# Patient Record
Sex: FEMALE | Race: WHITE
[De-identification: names, ages, dates, MRNs, and addresses within clinical notes are randomized per-mention and may not be internally consistent; named-entity substitution may affect disease eponyms.]

---

## 2019-06-04 ENCOUNTER — HOSPITAL ENCOUNTER (INPATIENT)
Dept: HOSPITAL 91 - MS1 | Age: 59
LOS: 5 days | Discharge: HOME | DRG: 193 | End: 2019-06-09
Payer: COMMERCIAL

## 2019-06-04 ENCOUNTER — HOSPITAL ENCOUNTER (INPATIENT)
Dept: HOSPITAL 10 - 6WM | Age: 59
LOS: 5 days | Discharge: HOME | DRG: 193 | End: 2019-06-09
Attending: INTERNAL MEDICINE | Admitting: INTERNAL MEDICINE
Payer: COMMERCIAL

## 2019-06-04 VITALS — SYSTOLIC BLOOD PRESSURE: 142 MMHG | DIASTOLIC BLOOD PRESSURE: 83 MMHG | RESPIRATION RATE: 20 BRPM | HEART RATE: 104 BPM

## 2019-06-04 VITALS — HEART RATE: 94 BPM

## 2019-06-04 VITALS — DIASTOLIC BLOOD PRESSURE: 67 MMHG | RESPIRATION RATE: 18 BRPM | SYSTOLIC BLOOD PRESSURE: 117 MMHG | HEART RATE: 97 BPM

## 2019-06-04 VITALS — SYSTOLIC BLOOD PRESSURE: 133 MMHG | DIASTOLIC BLOOD PRESSURE: 71 MMHG | RESPIRATION RATE: 20 BRPM | HEART RATE: 99 BPM

## 2019-06-04 VITALS — SYSTOLIC BLOOD PRESSURE: 142 MMHG | HEART RATE: 97 BPM | RESPIRATION RATE: 18 BRPM | DIASTOLIC BLOOD PRESSURE: 72 MMHG

## 2019-06-04 VITALS — HEART RATE: 101 BPM | SYSTOLIC BLOOD PRESSURE: 130 MMHG | RESPIRATION RATE: 18 BRPM | DIASTOLIC BLOOD PRESSURE: 64 MMHG

## 2019-06-04 VITALS
HEIGHT: 65 IN | HEIGHT: 65 IN | BODY MASS INDEX: 36.73 KG/M2 | WEIGHT: 220.46 LBS | WEIGHT: 220.46 LBS | BODY MASS INDEX: 36.73 KG/M2

## 2019-06-04 VITALS — HEART RATE: 103 BPM

## 2019-06-04 VITALS — HEART RATE: 95 BPM

## 2019-06-04 DIAGNOSIS — G47.33: ICD-10-CM

## 2019-06-04 DIAGNOSIS — J18.9: Primary | ICD-10-CM

## 2019-06-04 DIAGNOSIS — J96.01: ICD-10-CM

## 2019-06-04 DIAGNOSIS — F32.9: ICD-10-CM

## 2019-06-04 DIAGNOSIS — J45.909: ICD-10-CM

## 2019-06-04 DIAGNOSIS — E78.00: ICD-10-CM

## 2019-06-04 DIAGNOSIS — J20.9: ICD-10-CM

## 2019-06-04 DIAGNOSIS — E78.5: ICD-10-CM

## 2019-06-04 DIAGNOSIS — I10: ICD-10-CM

## 2019-06-04 DIAGNOSIS — E66.9: ICD-10-CM

## 2019-06-04 DIAGNOSIS — E11.40: ICD-10-CM

## 2019-06-04 DIAGNOSIS — D64.9: ICD-10-CM

## 2019-06-04 LAB
ADD MAN DIFF?: YES
ALANINE AMINOTRANSFERASE: 23 IU/L (ref 13–69)
ALBUMIN/GLOBULIN RATIO: 0.86
ALBUMIN: 3.2 G/DL (ref 3.3–4.9)
ALKALINE PHOSPHATASE: 151 IU/L (ref 42–121)
ALLEN TEST: (no result)
ANION GAP: 7 (ref 5–13)
ARTERIAL BASE EXCESS: 0.9 MMOL/L (ref -3–3)
ARTERIAL BLOOD GAS OXYGEN SAT: 91.1 MMHG (ref 95–98)
ARTERIAL COHB: 0.3 % (ref 0–3)
ARTERIAL FRACTION OF OXYHGB: 90.6 % (ref 93–99)
ARTERIAL HCO3: 26.7 MMOL/L (ref 22–26)
ARTERIAL METHB: 0.3 % (ref 0–1.5)
ARTERIAL PCO2: 47.2 MMHG (ref 35–45)
ASPARTATE AMINO TRANSFERASE: 25 IU/L (ref 15–46)
BAND NEUTROPHILS #M: 2.4 10^3/UL (ref 0–0.6)
BAND NEUTROPHILS % (M): 24 % (ref 0–4)
BILIRUBIN,DIRECT: 0 MG/DL (ref 0–0.2)
BILIRUBIN,TOTAL: 0.3 MG/DL (ref 0.2–1.3)
BLOOD UREA NITROGEN: 19 MG/DL (ref 7–20)
CALCIUM: 8.5 MG/DL (ref 8.4–10.2)
CARBON DIOXIDE: 30 MMOL/L (ref 21–31)
CHLORIDE: 103 MMOL/L (ref 97–110)
CREATININE: 0.57 MG/DL (ref 0.44–1)
EOSINOPHILS % (M): 1 % (ref 0–7)
ERYTHROBLAST% (NRBC) (M): 1 % (ref 0–0)
FIO2: 33 %
GLOBULIN: 3.7 G/DL (ref 1.3–3.2)
GLUCOSE: 255 MG/DL (ref 70–220)
HEMATOCRIT: 35.3 % (ref 37–47)
HEMOGLOBIN A1C: 9.1 % (ref 0–5.9)
HEMOGLOBIN: 10.8 G/DL (ref 12–16)
IMMATURE GRANS #M: 0.13 10^3/UL (ref 0–0.03)
IMMATURE GRANS % (M): 1.3 % (ref 0–0.43)
LYMPHOCYTES #M: 1.4 10^3/UL (ref 0.8–2.9)
LYMPHOCYTES % (M): 14 % (ref 15–51)
MEAN CORPUSCULAR HEMOGLOBIN: 26 PG (ref 29–33)
MEAN CORPUSCULAR HGB CONC: 30.6 G/DL (ref 32–37)
MEAN CORPUSCULAR VOLUME: 85.1 FL (ref 82–101)
MEAN PLATELET VOLUME: 11.3 FL (ref 7.4–10.4)
MODE: (no result)
MONOCYTE #M: 0.7 10^3/UL (ref 0.3–0.9)
MONOCYTES % (M): 7 % (ref 0–11)
MYELOCYTES #M: 0.2 10^3/UL (ref 0–0)
MYELOCYTES % (M): 2 % (ref 0–0)
NUCLEATED RED BLOOD CELLS%: 0 /100WBC (ref 0–0)
O2 A-A PPRESDIFF RESPIRATORY: 115.3 MMHG (ref 7–24)
PLATELET COUNT: 258 10^3/UL (ref 140–415)
PLATELET ESTIMATE: NORMAL
POIKILOCYTOSIS: (no result) (ref 0–0)
POLYCHROMASIA: (no result) (ref 0–0)
POSITIVE DIFF: (no result)
POTASSIUM: 4 MMOL/L (ref 3.5–5.1)
PROCALCITONIN: 0.27 NG/ML (ref 0–0.1)
REACTIVE LYMPHOCYTES #M: 0.6 10^3/UL (ref 0–0)
REACTIVE LYMPHOCYTES% (M): 6 % (ref 0–0)
RED BLOOD COUNT: 4.15 10^6/UL (ref 4.2–5.4)
RED CELL DISTRIBUTION WIDTH: 15.2 % (ref 11.5–14.5)
SEG NEUT #M: 5 10^3/UL (ref 1.6–7.5)
SEGMENTED NEUTROPHILS (M) %: 46 % (ref 39–77)
SMUDGE%M: 4 % (ref 0–0)
SODIUM: 140 MMOL/L (ref 135–144)
TOTAL PROTEIN: 6.9 G/DL (ref 6.1–8.1)
WHITE BLOOD COUNT: 10.4 10^3/UL (ref 4.8–10.8)

## 2019-06-04 PROCEDURE — 94664 DEMO&/EVAL PT USE INHALER: CPT

## 2019-06-04 PROCEDURE — 84145 PROCALCITONIN (PCT): CPT

## 2019-06-04 PROCEDURE — 93306 TTE W/DOPPLER COMPLETE: CPT

## 2019-06-04 PROCEDURE — 82962 GLUCOSE BLOOD TEST: CPT

## 2019-06-04 PROCEDURE — 82803 BLOOD GASES ANY COMBINATION: CPT

## 2019-06-04 PROCEDURE — 85025 COMPLETE CBC W/AUTO DIFF WBC: CPT

## 2019-06-04 PROCEDURE — 84100 ASSAY OF PHOSPHORUS: CPT

## 2019-06-04 PROCEDURE — 80053 COMPREHEN METABOLIC PANEL: CPT

## 2019-06-04 PROCEDURE — 94640 AIRWAY INHALATION TREATMENT: CPT

## 2019-06-04 PROCEDURE — 83540 ASSAY OF IRON: CPT

## 2019-06-04 PROCEDURE — 87070 CULTURE OTHR SPECIMN AEROBIC: CPT

## 2019-06-04 PROCEDURE — 84443 ASSAY THYROID STIM HORMONE: CPT

## 2019-06-04 PROCEDURE — 36600 WITHDRAWAL OF ARTERIAL BLOOD: CPT

## 2019-06-04 PROCEDURE — 80048 BASIC METABOLIC PNL TOTAL CA: CPT

## 2019-06-04 PROCEDURE — 83735 ASSAY OF MAGNESIUM: CPT

## 2019-06-04 PROCEDURE — 83036 HEMOGLOBIN GLYCOSYLATED A1C: CPT

## 2019-06-04 PROCEDURE — 71045 X-RAY EXAM CHEST 1 VIEW: CPT

## 2019-06-04 RX ADMIN — INSULIN ASPART 1 UNIT: 100 INJECTION, SOLUTION INTRAVENOUS; SUBCUTANEOUS at 09:06

## 2019-06-04 RX ADMIN — GABAPENTIN SCH MG: 300 CAPSULE ORAL at 20:26

## 2019-06-04 RX ADMIN — IPRATROPIUM BROMIDE AND ALBUTEROL SULFATE 1 ML: .5; 3 SOLUTION RESPIRATORY (INHALATION) at 16:32

## 2019-06-04 RX ADMIN — INSULIN ASPART 1 UNIT: 100 INJECTION, SOLUTION INTRAVENOUS; SUBCUTANEOUS at 12:22

## 2019-06-04 RX ADMIN — ZOLPIDEM TARTRATE PRN MG: 5 TABLET, FILM COATED ORAL at 22:06

## 2019-06-04 RX ADMIN — DOXYCYCLINE 1 MLS/HR: 100 INJECTION, POWDER, LYOPHILIZED, FOR SOLUTION INTRAVENOUS at 09:00

## 2019-06-04 RX ADMIN — CEFTRIAXONE 1 MLS/HR: 1 INJECTION, SOLUTION INTRAVENOUS at 09:07

## 2019-06-04 RX ADMIN — LISINOPRIL SCH MG: 10 TABLET ORAL at 16:16

## 2019-06-04 RX ADMIN — INSULIN ASPART 1 UNIT: 100 INJECTION, SOLUTION INTRAVENOUS; SUBCUTANEOUS at 21:00

## 2019-06-04 RX ADMIN — VENLAFAXINE HYDROCHLORIDE 1 MG: 75 CAPSULE, EXTENDED RELEASE ORAL at 23:23

## 2019-06-04 RX ADMIN — FENOFIBRATE SCH MG: 145 TABLET ORAL at 16:16

## 2019-06-04 RX ADMIN — INSULIN ASPART 1 UNIT: 100 INJECTION, SOLUTION INTRAVENOUS; SUBCUTANEOUS at 18:28

## 2019-06-04 RX ADMIN — IPRATROPIUM BROMIDE AND ALBUTEROL SULFATE SCH ML: .5; 3 SOLUTION RESPIRATORY (INHALATION) at 14:15

## 2019-06-04 RX ADMIN — GABAPENTIN 1 MG: 300 CAPSULE ORAL at 16:15

## 2019-06-04 RX ADMIN — IPRATROPIUM BROMIDE AND ALBUTEROL SULFATE SCH ML: .5; 3 SOLUTION RESPIRATORY (INHALATION) at 20:00

## 2019-06-04 RX ADMIN — FOLIC ACID SCH MLS/HR: 5 INJECTION, SOLUTION INTRAMUSCULAR; INTRAVENOUS; SUBCUTANEOUS at 04:40

## 2019-06-04 RX ADMIN — IPRATROPIUM BROMIDE AND ALBUTEROL SULFATE SCH ML: .5; 3 SOLUTION RESPIRATORY (INHALATION) at 10:34

## 2019-06-04 RX ADMIN — IPRATROPIUM BROMIDE AND ALBUTEROL SULFATE 1 ML: .5; 3 SOLUTION RESPIRATORY (INHALATION) at 14:15

## 2019-06-04 RX ADMIN — IPRATROPIUM BROMIDE AND ALBUTEROL SULFATE 1 ML: .5; 3 SOLUTION RESPIRATORY (INHALATION) at 20:00

## 2019-06-04 RX ADMIN — ATORVASTATIN CALCIUM SCH MG: 40 TABLET, FILM COATED ORAL at 20:26

## 2019-06-04 RX ADMIN — VENLAFAXINE HYDROCHLORIDE SCH MG: 75 CAPSULE, EXTENDED RELEASE ORAL at 23:23

## 2019-06-04 RX ADMIN — THIAMINE HYDROCHLORIDE 1 MLS/HR: 100 INJECTION, SOLUTION INTRAMUSCULAR; INTRAVENOUS at 04:40

## 2019-06-04 RX ADMIN — FUROSEMIDE 1 MG: 10 INJECTION, SOLUTION INTRAVENOUS at 16:15

## 2019-06-04 RX ADMIN — VENLAFAXINE HYDROCHLORIDE SCH MG: 75 CAPSULE, EXTENDED RELEASE ORAL at 16:16

## 2019-06-04 RX ADMIN — BUPROPION HYDROCHLORIDE 1 MG: 150 TABLET, EXTENDED RELEASE ORAL at 16:16

## 2019-06-04 RX ADMIN — FAMOTIDINE 1 MG: 10 INJECTION, SOLUTION INTRAVENOUS at 09:07

## 2019-06-04 RX ADMIN — INSULIN GLARGINE 1 UNITS: 100 INJECTION, SOLUTION SUBCUTANEOUS at 20:59

## 2019-06-04 RX ADMIN — IPRATROPIUM BROMIDE AND ALBUTEROL SULFATE SCH ML: .5; 3 SOLUTION RESPIRATORY (INHALATION) at 04:38

## 2019-06-04 RX ADMIN — INSULIN ASPART 1 UNIT: 100 INJECTION, SOLUTION INTRAVENOUS; SUBCUTANEOUS at 17:39

## 2019-06-04 RX ADMIN — LISINOPRIL 1 MG: 10 TABLET ORAL at 16:16

## 2019-06-04 RX ADMIN — VENLAFAXINE HYDROCHLORIDE 1 MG: 75 CAPSULE, EXTENDED RELEASE ORAL at 16:16

## 2019-06-04 RX ADMIN — ATORVASTATIN CALCIUM 1 MG: 40 TABLET, FILM COATED ORAL at 20:26

## 2019-06-04 RX ADMIN — DIPHENHYDRAMINE HYDROCHLORIDE SCH MG: 50 INJECTION, SOLUTION INTRAMUSCULAR; INTRAVENOUS at 09:07

## 2019-06-04 RX ADMIN — BUPROPION HYDROCHLORIDE SCH MG: 150 TABLET, EXTENDED RELEASE ORAL at 16:16

## 2019-06-04 RX ADMIN — IPRATROPIUM BROMIDE AND ALBUTEROL SULFATE 1 ML: .5; 3 SOLUTION RESPIRATORY (INHALATION) at 04:38

## 2019-06-04 RX ADMIN — IPRATROPIUM BROMIDE AND ALBUTEROL SULFATE SCH ML: .5; 3 SOLUTION RESPIRATORY (INHALATION) at 16:32

## 2019-06-04 RX ADMIN — GABAPENTIN 1 MG: 300 CAPSULE ORAL at 20:26

## 2019-06-04 RX ADMIN — GABAPENTIN SCH MG: 300 CAPSULE ORAL at 16:15

## 2019-06-04 RX ADMIN — DEXTROSE MONOHYDRATE 1 MLS/HR: 5 INJECTION, SOLUTION INTRAVENOUS at 16:14

## 2019-06-04 RX ADMIN — ZOLPIDEM TARTRATE 1 MG: 5 TABLET, FILM COATED ORAL at 22:06

## 2019-06-04 RX ADMIN — IPRATROPIUM BROMIDE AND ALBUTEROL SULFATE 1 ML: .5; 3 SOLUTION RESPIRATORY (INHALATION) at 10:34

## 2019-06-04 RX ADMIN — FENOFIBRATE 1 MG: 145 TABLET, FILM COATED ORAL at 16:16

## 2019-06-04 NOTE — CONS
DATE OF ADMISSION: 06/04/2019

DATE OF CONSULTATION:  06/04/2019

 

 

 

TYPE OF CONSULTATION:  Pulmonary.

 

Thank you, Dr. El, for this consultation.

 

HISTORY OF PRESENT ILLNESS:  This is a 59-year-old lady who presented to outside facility at [facility] with increasing shortness of breath, cough, chest congestion but no hemoptysis, hematemesis. 
 States the symptoms had been ongoing for 4 to 5 days.

 

PAST MEDICAL HISTORY:  Hyperlipidemia, diabetes mellitus.

 

MEDICATIONS:  Per chart.

 

ALLERGIES:  NONE.

 

SOCIAL HISTORY:  She is a current nonsmoker, no alcohol, no history of drug use.

 

FAMILY HISTORY:  Noncontributory.

 

SYSTEMS REVIEW:  A 12-point review of systems was negative other than that mentioned above.

 

PHYSICAL EXAMINATION:

GENERAL:  Moderately obese lady, comfortable at rest, talking in full and complete sentences.

VITAL SIGNS:  Currently afebrile, pulse is 90, blood pressure 133/71, O2 saturation 96%, FiO2 of 5 li
ters.

NECK:  Supple.  No JVD.

CARDIAC:  S1, S2.  No added sounds or murmurs.

CHEST:  Diminished air entry bilaterally.

ABDOMEN:  Soft, nontender.  No guarding or rebound.

EXTREMITIES:  No cyanosis, clubbing or edema.

NEUROLOGIC:  Grossly intact.  No focal deficits.

 

LABORATORIES:  White count 10.4, hemoglobin 10.8, platelets of 258.  BUN 19, creatinine 0.57.  PaO2 w
as 64 on 4 liters.

 

DIAGNOSTIC DATA:  Chest x-ray demonstrated mild congestive cardiac failure, possible bibasilar bronch
opneumonia.

 

IMPRESSION AND PLAN:  Acute hypoxemic respiratory failure, likely secondary to combination of congest
reggie heart failure and possible community-acquired pneumonia.

 

The patient will require:

1.  Continue current antibiotics.

2.  Check procalcitonin level.

3.  Consider echocardiogram and trial of Lasix.

4.  DVT and GI prophylaxis.

 

 

Dictated By: WILLI FRIEND MD

 

SV/NTS

DD:    06/04/2019 14:58:11

DT:    06/04/2019 17:08:09

Conf#: [id number]

DID#:  [id number]

CC: ONEYDA LINDSEY MD;*EndCC*

## 2019-06-04 NOTE — HP
DATE OF ADMISSION: 06/04/2019

 

REASON FOR ADMISSION:  Transferred from [place] [facility] due to pneumonia, hypoxia.

 

HISTORY OF PRESENT ILLNESS:  This is a 59-year-old female with a past medical history of diabetes, hy
pertension, hyperlipidemia, depression, asthma who was having shortness of breath and weakness and on
going cough for past 1 week.  The patient said that her daughter was sick at home.  She was intermitt
ently coughing for the last few days and progressively worsening shortness of breath until last night
.  She had not been sleeping for the last 3 days.  She was also having intermittently on and off feve
rs as high as to 101.  She could not feel any better.  She had some anterior chest, sharp pain associ
ated with coughing that made her worried and came to the emergency department at [facility].  Th
ere, patient had a blood pressure 165/80, pulse 97, temperature 100.2, respirations 24.  The patient 
was put on 6 liters.  The patient had a BUN and creatinine within normal limit.  LFTs within normal l
imit.  Lactate was 1.4.  EKG showed nonspecific ST-T wave changes, T-wave inversions in V1, V2.  Ches
t x-ray showed bibasilar infiltrates.  White count was 10.8.  Patient was started on Rocephin and dox
ycycline and was transferred due to insurance reasons.  Currently, the patient feels that she is feel
ing a little better but still coughing intermittently at times.

 

PAST MEDICAL HISTORY:

1.  Diabetes type 2.

2.  Hypertension.

3.  Hyperlipidemia.

4.  Obesity.

5.  Diabetic neuropathy.

6.  Depression.

 

ALLERGIES:  NONE.

 

MEDICATIONS TAKING AT HOME:

1.  Metformin 1000.

2.  Atenolol 50.

3.  Atorvastatin 40.

4.  Buprenorphine 300.

5.  Fenofibrate 160.

6.  Gabapentin 300.

7.  Lisinopril 10.

8.  Naproxen.

9.  NovoLog.

10.  Tramadol.

 

SOCIAL HISTORY:  The patient is an ex-smoker.  Denies any alcohol, any recreational drug use.  Vicki kebede lives with her daughter in [place].

 

FAMILY HISTORY:  Noncontributory.

 

REVIEW OF SYSTEMS:  The patient complained had fevers, shortness of breath and cough with yoko sputu
m for past 1 week.  Denies any orthopnea, PND, lower extremity edema.  Had some anterior chest pain, 
sharp, associated with coughing.  Denies any abdominal pain, nausea, vomiting, diarrhea.  Had some he
adache.  Denied any blurry vision.  Denies any focal neurological deficits.

 

PHYSICAL EXAMINATION:

VITAL SIGNS:  Currently, temperature 98.5, blood pressure 133/71, heart rate 99, currently 94% to 95%
 on 6 liters oxygen.

GENERAL:  The patient is awake, alert, oriented, does not appear to be any acute distress.

HEENT:  Pupils equal, round, reactive to light.

NECK:  Supple.

HEART:  Regular rate and rhythm.

LUNGS:  A few scattered rhonchi, some decreased breath sounds bilaterally.

ABDOMEN:  Obese, positive bowel sounds.

EXTREMITIES:  Trace edema.

 

DIAGNOSTIC DATA:

BUN of 19, creatinine 0.5, glucose 255, alkaline phosphatase 151.  White count 10.4, hemoglobin 10.8,
 platelet count 258.  ABG showed pH of 7.3, pCO2 of 47, pO2 of 64, bicarbonate of 26.  Chest x-ray ha
d showed bronchopneumonia of the lower lung zones.

 

ASSESSMENT:  This is a 59-year-old female who presented with:

1.  Pneumonia in the lower lung zones.

2.  Systemic inflammatory response syndrome secondary to #2.

3.  Hypoxia secondary to #1.

4.  Rule out obstructive sleep apnea.

5.  Hypertension.

6.  Diabetes type 2.

7.  Depression.

8.  Hyperlipidemia.

9.  Hypercholesterolemia.

10.  History of asthma.

 

PLAN:  At this period of time, the patient is admitted to [facility].  The patient will be continued on IV 
antibiotics with Rocephin and azithromycin.  The patient will also be continued on DuoNeb around the 
clock.  Gentle IV fluids.  Sputum culture will be sent.  Pulmonary consultation will be requested.  R
est of the treatment will depend on the patient's hospitalization course.

 

 

Dictated By: LESLEY WEISS

 

RB/MARGIE

DD:    06/04/2019 12:28:07

DT:    06/04/2019 16:04:38

Conf#: [id number]

DID#:  [id number]

CC: ONEYDA LINDSEY MD;*End*

## 2019-06-04 NOTE — QN
Documentation


Comment


pt seen and examined











LESLEY WEISS MD               Jun 4, 2019 12:22

## 2019-06-05 VITALS — DIASTOLIC BLOOD PRESSURE: 69 MMHG | RESPIRATION RATE: 20 BRPM | SYSTOLIC BLOOD PRESSURE: 123 MMHG | HEART RATE: 95 BPM

## 2019-06-05 VITALS — HEART RATE: 97 BPM | SYSTOLIC BLOOD PRESSURE: 125 MMHG | DIASTOLIC BLOOD PRESSURE: 75 MMHG | RESPIRATION RATE: 20 BRPM

## 2019-06-05 VITALS — HEART RATE: 97 BPM

## 2019-06-05 VITALS — SYSTOLIC BLOOD PRESSURE: 101 MMHG | HEART RATE: 99 BPM | RESPIRATION RATE: 20 BRPM | DIASTOLIC BLOOD PRESSURE: 58 MMHG

## 2019-06-05 VITALS — HEART RATE: 95 BPM

## 2019-06-05 VITALS — HEART RATE: 93 BPM

## 2019-06-05 VITALS — SYSTOLIC BLOOD PRESSURE: 122 MMHG | RESPIRATION RATE: 20 BRPM | HEART RATE: 95 BPM | DIASTOLIC BLOOD PRESSURE: 70 MMHG

## 2019-06-05 VITALS — HEART RATE: 100 BPM

## 2019-06-05 VITALS — HEART RATE: 102 BPM

## 2019-06-05 VITALS — HEART RATE: 96 BPM

## 2019-06-05 RX ADMIN — FAMOTIDINE 1 MG: 20 TABLET ORAL at 21:03

## 2019-06-05 RX ADMIN — BUPROPION HYDROCHLORIDE SCH MG: 150 TABLET, EXTENDED RELEASE ORAL at 09:07

## 2019-06-05 RX ADMIN — GABAPENTIN 1 MG: 300 CAPSULE ORAL at 09:06

## 2019-06-05 RX ADMIN — IPRATROPIUM BROMIDE AND ALBUTEROL SULFATE 1 ML: .5; 3 SOLUTION RESPIRATORY (INHALATION) at 20:26

## 2019-06-05 RX ADMIN — IPRATROPIUM BROMIDE AND ALBUTEROL SULFATE 1 ML: .5; 3 SOLUTION RESPIRATORY (INHALATION) at 16:21

## 2019-06-05 RX ADMIN — FUROSEMIDE 1 MG: 10 INJECTION, SOLUTION INTRAVENOUS at 09:05

## 2019-06-05 RX ADMIN — INSULIN ASPART 1 UNIT: 100 INJECTION, SOLUTION INTRAVENOUS; SUBCUTANEOUS at 17:11

## 2019-06-05 RX ADMIN — INSULIN GLARGINE SCH UNITS: 100 INJECTION, SOLUTION SUBCUTANEOUS at 21:06

## 2019-06-05 RX ADMIN — IPRATROPIUM BROMIDE AND ALBUTEROL SULFATE 1 ML: .5; 3 SOLUTION RESPIRATORY (INHALATION) at 13:01

## 2019-06-05 RX ADMIN — FENOFIBRATE SCH MG: 145 TABLET ORAL at 09:06

## 2019-06-05 RX ADMIN — LISINOPRIL 1 MG: 10 TABLET ORAL at 09:06

## 2019-06-05 RX ADMIN — ATORVASTATIN CALCIUM SCH MG: 40 TABLET, FILM COATED ORAL at 21:03

## 2019-06-05 RX ADMIN — GABAPENTIN SCH MG: 300 CAPSULE ORAL at 13:13

## 2019-06-05 RX ADMIN — IPRATROPIUM BROMIDE AND ALBUTEROL SULFATE 1 ML: .5; 3 SOLUTION RESPIRATORY (INHALATION) at 08:34

## 2019-06-05 RX ADMIN — THIAMINE HYDROCHLORIDE 1 MLS/HR: 100 INJECTION, SOLUTION INTRAMUSCULAR; INTRAVENOUS at 04:00

## 2019-06-05 RX ADMIN — GABAPENTIN 1 MG: 300 CAPSULE ORAL at 13:13

## 2019-06-05 RX ADMIN — INSULIN GLARGINE 1 UNITS: 100 INJECTION, SOLUTION SUBCUTANEOUS at 21:06

## 2019-06-05 RX ADMIN — IPRATROPIUM BROMIDE AND ALBUTEROL SULFATE SCH ML: .5; 3 SOLUTION RESPIRATORY (INHALATION) at 20:26

## 2019-06-05 RX ADMIN — CEFTRIAXONE 1 MLS/HR: 1 INJECTION, SOLUTION INTRAVENOUS at 16:15

## 2019-06-05 RX ADMIN — IPRATROPIUM BROMIDE AND ALBUTEROL SULFATE SCH ML: .5; 3 SOLUTION RESPIRATORY (INHALATION) at 16:21

## 2019-06-05 RX ADMIN — AZITHROMYCIN MONOHYDRATE SCH MLS/HR: 500 INJECTION, POWDER, LYOPHILIZED, FOR SOLUTION INTRAVENOUS at 17:04

## 2019-06-05 RX ADMIN — IPRATROPIUM BROMIDE AND ALBUTEROL SULFATE SCH ML: .5; 3 SOLUTION RESPIRATORY (INHALATION) at 13:01

## 2019-06-05 RX ADMIN — GABAPENTIN SCH MG: 300 CAPSULE ORAL at 09:06

## 2019-06-05 RX ADMIN — IPRATROPIUM BROMIDE AND ALBUTEROL SULFATE SCH ML: .5; 3 SOLUTION RESPIRATORY (INHALATION) at 08:34

## 2019-06-05 RX ADMIN — BUPROPION HYDROCHLORIDE 1 MG: 150 TABLET, EXTENDED RELEASE ORAL at 09:07

## 2019-06-05 RX ADMIN — THIAMINE HYDROCHLORIDE 1 MLS/HR: 100 INJECTION, SOLUTION INTRAMUSCULAR; INTRAVENOUS at 11:14

## 2019-06-05 RX ADMIN — FAMOTIDINE SCH MG: 20 TABLET ORAL at 21:03

## 2019-06-05 RX ADMIN — LISINOPRIL SCH MG: 10 TABLET ORAL at 09:06

## 2019-06-05 RX ADMIN — IPRATROPIUM BROMIDE AND ALBUTEROL SULFATE SCH ML: .5; 3 SOLUTION RESPIRATORY (INHALATION) at 05:03

## 2019-06-05 RX ADMIN — FOLIC ACID SCH MLS/HR: 5 INJECTION, SOLUTION INTRAMUSCULAR; INTRAVENOUS; SUBCUTANEOUS at 11:14

## 2019-06-05 RX ADMIN — IPRATROPIUM BROMIDE AND ALBUTEROL SULFATE SCH ML: .5; 3 SOLUTION RESPIRATORY (INHALATION) at 00:07

## 2019-06-05 RX ADMIN — FOLIC ACID SCH MLS/HR: 5 INJECTION, SOLUTION INTRAMUSCULAR; INTRAVENOUS; SUBCUTANEOUS at 04:00

## 2019-06-05 RX ADMIN — INSULIN ASPART 1 UNIT: 100 INJECTION, SOLUTION INTRAVENOUS; SUBCUTANEOUS at 11:44

## 2019-06-05 RX ADMIN — DIPHENHYDRAMINE HYDROCHLORIDE SCH MG: 50 INJECTION, SOLUTION INTRAMUSCULAR; INTRAVENOUS at 09:05

## 2019-06-05 RX ADMIN — GABAPENTIN SCH MG: 300 CAPSULE ORAL at 21:03

## 2019-06-05 RX ADMIN — INSULIN ASPART 1 UNIT: 100 INJECTION, SOLUTION INTRAVENOUS; SUBCUTANEOUS at 09:19

## 2019-06-05 RX ADMIN — FENOFIBRATE 1 MG: 145 TABLET, FILM COATED ORAL at 09:06

## 2019-06-05 RX ADMIN — GABAPENTIN 1 MG: 300 CAPSULE ORAL at 21:03

## 2019-06-05 RX ADMIN — IPRATROPIUM BROMIDE AND ALBUTEROL SULFATE 1 ML: .5; 3 SOLUTION RESPIRATORY (INHALATION) at 05:03

## 2019-06-05 RX ADMIN — ATORVASTATIN CALCIUM 1 MG: 40 TABLET, FILM COATED ORAL at 21:03

## 2019-06-05 RX ADMIN — IPRATROPIUM BROMIDE AND ALBUTEROL SULFATE 1 ML: .5; 3 SOLUTION RESPIRATORY (INHALATION) at 00:07

## 2019-06-05 RX ADMIN — DEXTROSE MONOHYDRATE 1 MLS/HR: 5 INJECTION, SOLUTION INTRAVENOUS at 17:04

## 2019-06-05 RX ADMIN — CEFTRIAXONE SCH MLS/HR: 1 INJECTION, SOLUTION INTRAVENOUS at 16:15

## 2019-06-05 RX ADMIN — FAMOTIDINE 1 MG: 10 INJECTION, SOLUTION INTRAVENOUS at 09:05

## 2019-06-05 RX ADMIN — INSULIN ASPART 1 UNIT: 100 INJECTION, SOLUTION INTRAVENOUS; SUBCUTANEOUS at 21:00

## 2019-06-05 NOTE — CONS
Assessment/Plan


Assessment/Plan


Assessment/Plan (Daily)


Assessment and recommendations;





1.  Patient admitted with shortness of breath due to combination of acute 


bronchitis with CHF exacerbation with interval improvement.


2.  History of anemia, diabetes, hyperlipidemia, peripheral neuropathy, 


hypertension and arthritis.





Continue current supportive care.  Patient responding well to current treatment 


regimen.





Consultation Date/Type/Reason


Admit Date/Time


Jun 4, 2019 at 03:10


Initial Consult Date





Type of Consult


Pulmonary


Reason for Consultation


Patient condition is improving.  Reports significant reduction in shortness of 


breath.  Denies any coughing, wheezing.


General exam; middle-aged female, awake alert, currently no distress.


Date/Time of Note


DATE: 6/5/19 


TIME: 10:54





Exam/Review of Systems


Exam


Vitals





Vital Signs


  Date      Temp  Pulse  Resp  B/P (MAP)   Pulse Ox  O2          O2 Flow    FiO2


Time                                                 Delivery    Rate


    6/5/19          108    20                    95  Nasal             4.0


     08:44                                           Cannula


    6/5/19  98.4                   123/69


     07:17                           (87)








Intake and Output





6/4/19 6/4/19 6/5/19





1515:00


23:00


07:00





IntakeIntake Total


50 ml


2060 ml


600 ml





BalanceBalance


50 ml


2060 ml


600 ml











Exam


HEENT exam; supple neck, positive JVD.  No lymphadenopathy.  Midline trachea.  


No thyromegaly.  No neck masses.  Patient has fair dentition.


Chest exam; diminished but clear breath sounds.  S1-S2 audible, no murmurs.  


Regular rhythm.


Abdomen exam; soft, nontender.  No organomegaly.  Bowel sounds audible.


Extremity exam; no peripheral edema clubbing.


CNS exam; no focal deficit.





Results


Result Diagram:  


6/4/19 0538                                                                     


          6/4/19 0536





Results 24hrs





Laboratory Tests


    Test
            6/4/19
12:14  6/4/19
17:27  6/4/19
20:28  6/5/19
09:01


    Bedside Glucose        288  H        329  H        307  H        296  H








Medications


Medication





Current Medications


Albuterol/ Ipratropium (Duoneb) 3 ml Q4H RESP  THERAPY HHN  Last administered on


6/5/19at 08:34; Admin Dose 3 ML;  Start 6/4/19 at 05:00


Albuterol/ Ipratropium (Duoneb) 3 ml Q2H RESP THERAPY  PRN HHN SHORTNESS OF 


BREATH;  Start 6/4/19 at 04:00


Sodium Chloride 1,000 ml @  40 mls/hr Q24H IV  Last administered on 6/4/19at 


04:40; Admin Dose 40 MLS/HR;  Start 6/4/19 at 04:00


Ceftriaxone Sodium 50 ml @  100 mls/hr DAILY IVPB  Last administered on 6/4/19at


09:07; Admin Dose 100 MLS/HR;  Start 6/4/19 at 09:00


Acetaminophen (Tylenol Tab) 650 mg Q6H  PRN PO MILD PAIN(1-3)OR ELEVATED TEMP;  


Start 6/4/19 at 04:00


Diagnostic Test (Pha) (Accu-Chek) 1 ea 02 XX ;  Start 6/5/19 at 02:00


Insulin Aspart (Novolog Insulin Pen) NOVOLOG *MILD* ALGORITHM WITH MEALS  BEDTI


ME SC  Last administered on 6/5/19at 09:19; Admin Dose 4 UNIT;  Start 6/4/19 at 


08:00


Miscellaneous Information 1 ea NOTE XX ;  Start 6/4/19 at 04:30


Glucose (Glutose) 15 gm Q15M  PRN PO DECREASED GLUCOSE;  Start 6/4/19 at 04:30


Glucose (Glutose) 22.5 gm Q15M  PRN PO DECREASED GLUCOSE;  Start 6/4/19 at 04:30


Dextrose (D50w Syringe) 25 ml Q15M  PRN IV DECREASED GLUCOSE;  Start 6/4/19 at 


04:30


Dextrose (D50w Syringe) 50 ml Q15M  PRN IV DECREASED GLUCOSE;  Start 6/4/19 at 


04:30


Glucagon (Glucagen) 1 mg Q15M  PRN IM DECREASED GLUCOSE;  Start 6/4/19 at 04:30


Glucose (Glutose) 15 gm Q15M  PRN BUCCAL DECREASED GLUCOSE;  Start 6/4/19 at 


04:30


Famotidine (Pepcid Iv) 20 mg DAILY IV  Last administered on 6/5/19at 09:05; 


Admin Dose 20 MG;  Start 6/4/19 at 09:00


Atorvastatin Calcium (Lipitor) 40 mg QHS PO  Last administered on 6/4/19at 


20:26; Admin Dose 40 MG;  Start 6/4/19 at 21:00


Bupropion HCl (Wellbutrin Xl) 300 mg DAILY PO  Last administered on 6/5/19at 


09:07; Admin Dose 300 MG;  Start 6/4/19 at 13:15


Fenofibrate (Tricor) 145 mg DAILY PO  Last administered on 6/5/19at 09:06; Admin


Dose 145 MG;  Start 6/4/19 at 13:15


Gabapentin (Neurontin) 300 mg TID PO  Last administered on 6/5/19at 09:06; Admin


Dose 300 MG;  Start 6/4/19 at 13:15


Lisinopril (Zestril) 10 mg DAILY PO  Last administered on 6/5/19at 09:06; Admin 


Dose 10 MG;  Start 6/4/19 at 13:15


Metformin HCl (Glucophage) 1,000 mg WITH BREAKFAST  DINNE PO  Last administered 


on 6/5/19at 09:26; Admin Dose 1,000 MG;  Start 6/4/19 at 18:00


Tramadol HCl (Ultram) 50 mg BID PO  Last administered on 6/5/19at 09:27; Admin 


Dose 50 MG;  Start 6/4/19 at 13:15


Venlafaxine HCl (Effexor Xr) 150 mg DAILY PO  Last administered on 6/4/19at 


23:23; Admin Dose 150 MG;  Start 6/4/19 at 13:30


Diagnostic Test (Pha) (Accu-Chek) 1 ea AC MEALS AND  BEDTIME XX  Last 


administered on 6/5/19at 09:03; Admin Dose 1 EA;  Start 6/4/19 at 17:30


Insulin Glargine (Lantus) 15 units DAILY@2000 SC  Last administered on 6/4/19at 


20:59; Admin Dose 15 UNITS;  Start 6/4/19 at 20:00


Insulin Aspart (Novolog Insulin Pen) 5 unit WITH  MEALS SC  Last administered on


6/5/19at 09:19; Admin Dose 5 UNIT;  Start 6/4/19 at 18:00


Furosemide (Lasix) 40 mg DAILY IV  Last administered on 6/5/19at 09:05; Admin 


Dose 40 MG;  Start 6/4/19 at 15:00


Zolpidem Tartrate (Ambien) 5 mg HS  PRN PO INSOMNIA Last administered on 


6/4/19at 22:06; Admin Dose 5 MG;  Start 6/4/19 at 22:00











KAJAL MANSFIELD                     Jun 5, 2019 10:56

## 2019-06-05 NOTE — PN
Date/Time of Note


Date/Time of Note


DATE: 6/5/19 


TIME: 14:36





Assessment/Plan


VTE Prophylaxis


Risk score (from Ns)>0 risk:  1


SCD applied (from Ns):  Yes


Pharmacological prophylaxis:  NA/contraindicated


Pharm contraindication:  low risk/ambulating





Lines/Catheters


IV Catheter Type (from [facility] [place]):  Peripheral IV


Urinary Cath still in place:  No





Assessment/Plan


Assessment/Plan


is is a 59-year-old female who presented with:


1.  SOB Pneumonia in the lower lung zones. vs CHF


2.  Systemic inflammatory response syndrome secondary to #2.


3.  Hypoxia secondary to #1.


4.  Rule out obstructive sleep apnea.


5.  Hypertension.


6.  Diabetes type 2.


7.  Depression.


8.  Hyperlipidemia.


9.  Hypercholesterolemia.


10.  History of asthma.





pLAN


- increase Lantus to 22 oh and increase her mealtime insulin.


-Added Robitussin for as needed cough


-Nebs


-cw azithromycin/Rocephin


-Lasix per pulmonary


-Echo pending


-GI/DVT prophylaxis


Result Diagram:  


6/4/19 0538                                                                     


          6/4/19 0536





Results 24hrs





Laboratory Tests


    Test
            6/4/19
17:27  6/4/19
20:28  6/5/19
09:01  6/5/19
11:41


    Bedside Glucose        329  H        307  H        296  H        248  H








Subjective


24 Hr Interval Summary


Free Text/Dictation


Feels better today.


Intermittent coughing


Still on 4 L of nasal cannula





Exam/Review of Systems


Exam


Vitals





Vital Signs


  Date      Temp  Pulse  Resp  B/P (MAP)   Pulse Ox  O2          O2 Flow    FiO2


Time                                                 Delivery    Rate


    6/5/19           93    20                    96  Nasal             4.0


     13:12                                           Cannula


    6/5/19  98.9                   125/75


     11:27                           (92)








Intake and Output





6/4/19 6/4/19 6/5/19





1515:00


23:00


07:00





IntakeIntake Total


50 ml


2060 ml


600 ml





BalanceBalance


50 ml


2060 ml


600 ml











Exam


GENERAL:  The patient is awake, alert, oriented, does not appear to be any acute


distress.


HEENT:  Pupils equal, round, reactive to light.


NECK:  Supple.


HEART:  Regular rate and rhythm.


LUNGS:  A few scattered rhonchi, some decreased breath sounds bilaterally.


ABDOMEN:  Obese, positive bowel sounds.


EXTREMITIES:  Trace edema.





Results


Results 24hrs





Laboratory Tests


    Test
            6/4/19
17:27  6/4/19
20:28  6/5/19
09:01  6/5/19
11:41


    Bedside Glucose        329  H        307  H        296  H        248  H








Medications


Medication





Current Medications


Albuterol/ Ipratropium (Duoneb) 3 ml Q4H RESP  THERAPY HHN  Last administered on


6/5/19at 13:01; Admin Dose 3 ML;  Start 6/4/19 at 05:00


Albuterol/ Ipratropium (Duoneb) 3 ml Q2H RESP THERAPY  PRN HHN SHORTNESS OF 


BREATH;  Start 6/4/19 at 04:00


Acetaminophen (Tylenol Tab) 650 mg Q6H  PRN PO MILD PAIN(1-3)OR ELEVATED TEMP;  


Start 6/4/19 at 04:00


Diagnostic Test (Pha) (Accu-Chek) 1 ea 02 XX ;  Start 6/5/19 at 02:00


Insulin Aspart (Novolog Insulin Pen) NOVOLOG *MILD* ALGORITHM WITH MEALS  


BEDTIME SC  Last administered on 6/5/19at 11:44; Admin Dose 3 UNIT;  Start 


6/4/19 at 08:00


Miscellaneous Information 1 ea NOTE XX ;  Start 6/4/19 at 04:30


Glucose (Glutose) 15 gm Q15M  PRN PO DECREASED GLUCOSE;  Start 6/4/19 at 04:30


Glucose (Glutose) 22.5 gm Q15M  PRN PO DECREASED GLUCOSE;  Start 6/4/19 at 04:30


Dextrose (D50w Syringe) 25 ml Q15M  PRN IV DECREASED GLUCOSE;  Start 6/4/19 at 


04:30


Dextrose (D50w Syringe) 50 ml Q15M  PRN IV DECREASED GLUCOSE;  Start 6/4/19 at 


04:30


Glucagon (Glucagen) 1 mg Q15M  PRN IM DECREASED GLUCOSE;  Start 6/4/19 at 04:30


Glucose (Glutose) 15 gm Q15M  PRN BUCCAL DECREASED GLUCOSE;  Start 6/4/19 at 


04:30


Famotidine (Pepcid Iv) 20 mg DAILY IV  Last administered on 6/5/19at 09:05; 


Admin Dose 20 MG;  Start 6/4/19 at 09:00


Atorvastatin Calcium (Lipitor) 40 mg QHS PO  Last administered on 6/4/19at 


20:26; Admin Dose 40 MG;  Start 6/4/19 at 21:00


Bupropion HCl (Wellbutrin Xl) 300 mg DAILY PO  Last administered on 6/5/19at 0


9:07; Admin Dose 300 MG;  Start 6/4/19 at 13:15


Fenofibrate (Tricor) 145 mg DAILY PO  Last administered on 6/5/19at 09:06; Admin


Dose 145 MG;  Start 6/4/19 at 13:15


Gabapentin (Neurontin) 300 mg TID PO  Last administered on 6/5/19at 13:13; Admin


Dose 300 MG;  Start 6/4/19 at 13:15


Lisinopril (Zestril) 10 mg DAILY PO  Last administered on 6/5/19at 09:06; Admin 


Dose 10 MG;  Start 6/4/19 at 13:15


Metformin HCl (Glucophage) 1,000 mg WITH BREAKFAST  DINNE PO  Last administered 


on 6/5/19at 09:26; Admin Dose 1,000 MG;  Start 6/4/19 at 18:00


Tramadol HCl (Ultram) 50 mg BID PO  Last administered on 6/5/19at 09:27; Admin 


Dose 50 MG;  Start 6/4/19 at 13:15


Venlafaxine HCl (Effexor Xr) 150 mg DAILY PO  Last administered on 6/4/19at 


23:23; Admin Dose 150 MG;  Start 6/4/19 at 13:30


Diagnostic Test (Pha) (Accu-Chek) 1 ea AC MEALS AND  BEDTIME XX  Last 


administered on 6/5/19at 11:45; Admin Dose 1 EA;  Start 6/4/19 at 17:30


Furosemide (Lasix) 40 mg DAILY IV  Last administered on 6/5/19at 09:05; Admin D


ose 40 MG;  Start 6/4/19 at 15:00


Zolpidem Tartrate (Ambien) 5 mg HS  PRN PO INSOMNIA Last administered on 


6/4/19at 22:06; Admin Dose 5 MG;  Start 6/4/19 at 22:00


Ceftriaxone Sodium 50 ml @  100 mls/hr Q24H IVPB ;  Start 6/5/19 at 16:00


Insulin Aspart (Novolog Insulin Pen) 7 unit WITH  MEALS SC ;  Start 6/5/19 at 


18:00


Insulin Glargine (Lantus) 22 units DAILY@2000 SC ;  Start 6/5/19 at 20:00


Guaifenesin/ Dextromethorphan (Robitussin Dm Liquid Cup) 10 ml Q4H  PRN PO 


COUGH;  Start 6/5/19 at 15:00;  Status LYNDAV











LESLEY WEISS MD               Jun 5, 2019 14:39

## 2019-06-05 NOTE — RADRPT
Echocardiogram Report

 

Patient Name: Leticia SALINAStient ID: [id number]

: 1960 (59y 2m)Study Date: 2019 9:43:37 AM

Gender: FAccession #: [id number]

Tech: LE                                  Location: [facility]

Ref.Physician: WILLI FRIEND            Height(Cm):            

 

BSA: Weight(Kg):

Quality: Technically Difficult StudyOrder Physician: WILLI FRIEND

Account #:

 

 

Procedures:

 

Echocardiographic Report:

Transthoracic echocardiogram with complete 2D, M-Mode, and doppler 

examination.

 

Indications:

 

Evaluate Left Ventricular function.

 

 

Measurements:

2D/M Mode                                          Doppler                                           
    

Measurement    Value    Normal Range               Measurement      Value    Normal Range            
    

LVIDd 2D       4.2      [ 3.8 - 5.2 ] cm           AV Mean Nakul      1.1      [ 70.0 - 90.0 ] cm/sec  
    

LVIDs 2D       2.9      [ 2.2 - 3.5 ] cm           AV Mean PG       5.0      [ 2.0 - 4.0 ] mmHg      
    

LVPWd 2D       1.1      [ 0.6 - 0.9 ] cm           AV Peak Nakul      1.6      [ 100.0 - 170.0 ] cm/sec
    

IVSd 2D        1.1      [ 0.6 - 0.9 ] cm           AV Peak PG       10.0     [ 2.0 - 9.0 ] mmHg      
    

EDV 2D         78.6     [ 46.0 - 106.0 ] ml        AV VTI           25.2     cm                      
    

ESV 2D         31.4     [ 14.0 - 42.0 ] ml         LVOT Peak Nakul    1.1      [ 70.0 - 110.0 ] cm/sec 
    

EF 2D          60.1     [ 54.0 - 74.0 ] percent    LVOT Peak PG     5.0      [ 2.0 - 6.0 ] mmHg      
    

LVOT Diam      2.0      [ 2.1 - 2.5 ] cm           MV E Peak Nakul    0.6      [ 60.0 - 130.0 ] cm/sec 
    

                                                   MV A Peak Ankul    0.9      [ 100.0 - 120.0 ] cm/sec
    

                                                   MV E/A           0.7      [ 0.8 - 1.5 ] ratio     
    

                                                   MV Decel Time    268      [ 104 - 258 ] msec      
    

                                                   Lat E` Nakul       0.1      [ 10.0 - 15.0 ] cm/sec  
    

                                                   Lateral E/E`     6.9      [ 1.0 - 2.0 ] ratio     
    

                                                   Med E` Nakul       0.1      cm/sec                  
    

                                                   MV E/A           0.7      [ 0.8 - 1.5 ] ratio     
    

                                                   PV Peak Nakul      0.7      [ 40.0 - 80.0 ] cm/sec  
    

                                                   PV Peak PG       2.0      mmHg                    
    

 

Findings:

 

Left Ventricle:

Hyperdynamic left ventricular systolic function. Normal left ventricular 

cavity size. Normal left ventricular wall thickness. Ejection fraction 

is visually estimated at >65 %. Tissue Doppler/Mitral Doppler indices 

are consistent with impaired relaxation (Stage I diastolic dysfunction).

 

Right Ventricle:

Normal right ventricular size. Normal right ventricular systolic 

function.

 

Left Atrium:

The left atrium is normal in size.

 

Right Atrium:

The right atrium is normal in size.

 

Mitral Valve:

Normal appearance and function of the mitral valve with trace 

physiologic regurgitation.

 

Aortic Valve:

Normal appearance of the aortic valve. No significant aortic stenosis or 

insufficiency.

 

Tricuspid Valve:

Normal appearance and function of the tricuspid valve with trace 

physiologic regurgitation.

 

Pulmonic Valve:

Pulmonic valve not well visualized.

 

Pericardium:

Normal pericardium with no significant pericardial effusion.

 

Aorta:

Normal aortic root.

 

IVC:

Normal size and normal respiratory collapse consistent with normal right 

atrial pressure.

 

 

Conclusions:

 

Hyperdynamic left ventricular systolic function. Normal left ventricular 

cavity size. Normal left ventricular wall thickness. Ejection fraction 

is visually estimated at >65 %. Tissue Doppler/Mitral Doppler indices 

are consistent with impaired relaxation (Stage I diastolic dysfunction).

).

 

Normal appearance and function of the mitral valve with trace 

physiologic regurgitation.

 

Normal appearance and function of the tricuspid valve with trace 

physiologic regurgitation.

 

Electronically Signed By:

 

Calros Swartz

2019 16:23:36 PDT

## 2019-06-06 VITALS — HEART RATE: 97 BPM | SYSTOLIC BLOOD PRESSURE: 118 MMHG | RESPIRATION RATE: 20 BRPM | DIASTOLIC BLOOD PRESSURE: 65 MMHG

## 2019-06-06 VITALS — HEART RATE: 88 BPM

## 2019-06-06 VITALS — HEART RATE: 79 BPM | SYSTOLIC BLOOD PRESSURE: 116 MMHG | DIASTOLIC BLOOD PRESSURE: 69 MMHG | RESPIRATION RATE: 18 BRPM

## 2019-06-06 VITALS — DIASTOLIC BLOOD PRESSURE: 60 MMHG | SYSTOLIC BLOOD PRESSURE: 132 MMHG | RESPIRATION RATE: 20 BRPM | HEART RATE: 107 BPM

## 2019-06-06 VITALS — SYSTOLIC BLOOD PRESSURE: 112 MMHG | HEART RATE: 83 BPM | RESPIRATION RATE: 18 BRPM | DIASTOLIC BLOOD PRESSURE: 70 MMHG

## 2019-06-06 VITALS — RESPIRATION RATE: 18 BRPM | HEART RATE: 90 BPM | DIASTOLIC BLOOD PRESSURE: 62 MMHG | SYSTOLIC BLOOD PRESSURE: 118 MMHG

## 2019-06-06 VITALS — HEART RATE: 85 BPM

## 2019-06-06 VITALS — HEART RATE: 79 BPM | RESPIRATION RATE: 20 BRPM | DIASTOLIC BLOOD PRESSURE: 63 MMHG | SYSTOLIC BLOOD PRESSURE: 103 MMHG

## 2019-06-06 VITALS — HEART RATE: 96 BPM

## 2019-06-06 LAB
ADD MAN DIFF?: NO
ANION GAP: 7 (ref 5–13)
BASOPHIL #: 0.1 10^3/UL (ref 0–0.1)
BASOPHILS %: 0.6 % (ref 0–2)
BLOOD UREA NITROGEN: 23 MG/DL (ref 7–20)
CALCIUM: 8.1 MG/DL (ref 8.4–10.2)
CARBON DIOXIDE: 32 MMOL/L (ref 21–31)
CHLORIDE: 101 MMOL/L (ref 97–110)
CREATININE: 0.56 MG/DL (ref 0.44–1)
EOSINOPHILS #: 0.1 10^3/UL (ref 0–0.5)
EOSINOPHILS %: 0.8 % (ref 0–7)
GLUCOSE: 203 MG/DL (ref 70–220)
HEMATOCRIT: 32.8 % (ref 37–47)
HEMOGLOBIN: 10.3 G/DL (ref 12–16)
IMMATURE GRANS #M: 0.21 10^3/UL (ref 0–0.03)
IMMATURE GRANS % (M): 2 % (ref 0–0.43)
LYMPHOCYTES #: 2.8 10^3/UL (ref 0.8–2.9)
LYMPHOCYTES %: 26.5 % (ref 15–51)
MAGNESIUM: 1 MG/DL (ref 1.7–2.5)
MEAN CORPUSCULAR HEMOGLOBIN: 26.5 PG (ref 29–33)
MEAN CORPUSCULAR HGB CONC: 31.4 G/DL (ref 32–37)
MEAN CORPUSCULAR VOLUME: 84.5 FL (ref 82–101)
MEAN PLATELET VOLUME: 11.3 FL (ref 7.4–10.4)
MONOCYTE #: 0.8 10^3/UL (ref 0.3–0.9)
MONOCYTES %: 7.3 % (ref 0–11)
NEUTROPHIL #: 6.7 10^3/UL (ref 1.6–7.5)
NEUTROPHILS %: 62.8 % (ref 39–77)
NUCLEATED RED BLOOD CELLS #: 0 10^3/UL (ref 0–0)
NUCLEATED RED BLOOD CELLS%: 0 /100WBC (ref 0–0)
PHOSPHORUS: 4.5 MG/DL (ref 2.5–4.9)
PLATELET COUNT: 278 10^3/UL (ref 140–415)
POTASSIUM: 3.7 MMOL/L (ref 3.5–5.1)
RED BLOOD COUNT: 3.88 10^6/UL (ref 4.2–5.4)
RED CELL DISTRIBUTION WIDTH: 15.4 % (ref 11.5–14.5)
SODIUM: 140 MMOL/L (ref 135–144)
WHITE BLOOD COUNT: 10.7 10^3/UL (ref 4.8–10.8)

## 2019-06-06 RX ADMIN — GABAPENTIN 1 MG: 300 CAPSULE ORAL at 20:21

## 2019-06-06 RX ADMIN — DEXTROSE MONOHYDRATE 1 MLS/HR: 5 INJECTION, SOLUTION INTRAVENOUS at 16:52

## 2019-06-06 RX ADMIN — GABAPENTIN SCH MG: 300 CAPSULE ORAL at 12:17

## 2019-06-06 RX ADMIN — FUROSEMIDE 1 MG: 10 INJECTION, SOLUTION INTRAVENOUS at 09:30

## 2019-06-06 RX ADMIN — CEFTRIAXONE 1 MLS/HR: 1 INJECTION, SOLUTION INTRAVENOUS at 16:52

## 2019-06-06 RX ADMIN — AZITHROMYCIN MONOHYDRATE SCH MLS/HR: 500 INJECTION, POWDER, LYOPHILIZED, FOR SOLUTION INTRAVENOUS at 16:52

## 2019-06-06 RX ADMIN — IPRATROPIUM BROMIDE AND ALBUTEROL SULFATE SCH ML: .5; 3 SOLUTION RESPIRATORY (INHALATION) at 14:18

## 2019-06-06 RX ADMIN — GABAPENTIN SCH MG: 300 CAPSULE ORAL at 20:21

## 2019-06-06 RX ADMIN — IPRATROPIUM BROMIDE AND ALBUTEROL SULFATE 1 ML: .5; 3 SOLUTION RESPIRATORY (INHALATION) at 14:18

## 2019-06-06 RX ADMIN — IPRATROPIUM BROMIDE AND ALBUTEROL SULFATE 1 ML: .5; 3 SOLUTION RESPIRATORY (INHALATION) at 04:52

## 2019-06-06 RX ADMIN — FENOFIBRATE 1 MG: 145 TABLET, FILM COATED ORAL at 09:30

## 2019-06-06 RX ADMIN — BUPROPION HYDROCHLORIDE SCH MG: 150 TABLET, EXTENDED RELEASE ORAL at 09:30

## 2019-06-06 RX ADMIN — VENLAFAXINE HYDROCHLORIDE 1 MG: 75 CAPSULE, EXTENDED RELEASE ORAL at 09:30

## 2019-06-06 RX ADMIN — GABAPENTIN 1 MG: 300 CAPSULE ORAL at 12:17

## 2019-06-06 RX ADMIN — VENLAFAXINE HYDROCHLORIDE SCH MG: 75 CAPSULE, EXTENDED RELEASE ORAL at 09:30

## 2019-06-06 RX ADMIN — LISINOPRIL 1 MG: 10 TABLET ORAL at 09:29

## 2019-06-06 RX ADMIN — FAMOTIDINE SCH MG: 20 TABLET ORAL at 09:30

## 2019-06-06 RX ADMIN — INSULIN GLARGINE 1 UNITS: 100 INJECTION, SOLUTION SUBCUTANEOUS at 20:29

## 2019-06-06 RX ADMIN — IPRATROPIUM BROMIDE AND ALBUTEROL SULFATE 1 ML: .5; 3 SOLUTION RESPIRATORY (INHALATION) at 17:38

## 2019-06-06 RX ADMIN — IPRATROPIUM BROMIDE AND ALBUTEROL SULFATE 1 ML: .5; 3 SOLUTION RESPIRATORY (INHALATION) at 20:05

## 2019-06-06 RX ADMIN — GABAPENTIN SCH MG: 300 CAPSULE ORAL at 09:29

## 2019-06-06 RX ADMIN — MAGNESIUM SULFATE HEPTAHYDRATE 1 MLS/HR: 40 INJECTION, SOLUTION INTRAVENOUS at 13:50

## 2019-06-06 RX ADMIN — INSULIN GLARGINE SCH UNITS: 100 INJECTION, SOLUTION SUBCUTANEOUS at 20:29

## 2019-06-06 RX ADMIN — BUPROPION HYDROCHLORIDE 1 MG: 150 TABLET, EXTENDED RELEASE ORAL at 09:30

## 2019-06-06 RX ADMIN — FAMOTIDINE SCH MG: 20 TABLET ORAL at 20:20

## 2019-06-06 RX ADMIN — IPRATROPIUM BROMIDE AND ALBUTEROL SULFATE SCH ML: .5; 3 SOLUTION RESPIRATORY (INHALATION) at 20:05

## 2019-06-06 RX ADMIN — IPRATROPIUM BROMIDE AND ALBUTEROL SULFATE SCH ML: .5; 3 SOLUTION RESPIRATORY (INHALATION) at 17:38

## 2019-06-06 RX ADMIN — GABAPENTIN 1 MG: 300 CAPSULE ORAL at 09:29

## 2019-06-06 RX ADMIN — CEFTRIAXONE SCH MLS/HR: 1 INJECTION, SOLUTION INTRAVENOUS at 16:52

## 2019-06-06 RX ADMIN — LISINOPRIL SCH MG: 10 TABLET ORAL at 09:29

## 2019-06-06 RX ADMIN — INSULIN ASPART 1 UNIT: 100 INJECTION, SOLUTION INTRAVENOUS; SUBCUTANEOUS at 20:54

## 2019-06-06 RX ADMIN — IPRATROPIUM BROMIDE AND ALBUTEROL SULFATE 1 ML: .5; 3 SOLUTION RESPIRATORY (INHALATION) at 01:37

## 2019-06-06 RX ADMIN — METHYLPREDNISOLONE SODIUM SUCCINATE 1 MG: 40 INJECTION, POWDER, FOR SOLUTION INTRAMUSCULAR; INTRAVENOUS at 16:52

## 2019-06-06 RX ADMIN — FENOFIBRATE SCH MG: 145 TABLET ORAL at 09:30

## 2019-06-06 RX ADMIN — FAMOTIDINE 1 MG: 20 TABLET ORAL at 09:30

## 2019-06-06 RX ADMIN — INSULIN ASPART 1 UNIT: 100 INJECTION, SOLUTION INTRAVENOUS; SUBCUTANEOUS at 16:59

## 2019-06-06 RX ADMIN — FAMOTIDINE 1 MG: 20 TABLET ORAL at 20:20

## 2019-06-06 RX ADMIN — INSULIN ASPART 1 UNIT: 100 INJECTION, SOLUTION INTRAVENOUS; SUBCUTANEOUS at 17:16

## 2019-06-06 RX ADMIN — IPRATROPIUM BROMIDE AND ALBUTEROL SULFATE 1 ML: .5; 3 SOLUTION RESPIRATORY (INHALATION) at 08:20

## 2019-06-06 RX ADMIN — INSULIN ASPART 1 UNIT: 100 INJECTION, SOLUTION INTRAVENOUS; SUBCUTANEOUS at 12:22

## 2019-06-06 RX ADMIN — ATORVASTATIN CALCIUM SCH MG: 40 TABLET, FILM COATED ORAL at 20:21

## 2019-06-06 RX ADMIN — IPRATROPIUM BROMIDE AND ALBUTEROL SULFATE SCH ML: .5; 3 SOLUTION RESPIRATORY (INHALATION) at 04:52

## 2019-06-06 RX ADMIN — INSULIN ASPART 1 UNIT: 100 INJECTION, SOLUTION INTRAVENOUS; SUBCUTANEOUS at 12:17

## 2019-06-06 RX ADMIN — IPRATROPIUM BROMIDE AND ALBUTEROL SULFATE SCH ML: .5; 3 SOLUTION RESPIRATORY (INHALATION) at 01:37

## 2019-06-06 RX ADMIN — INSULIN ASPART 1 UNIT: 100 INJECTION, SOLUTION INTRAVENOUS; SUBCUTANEOUS at 09:11

## 2019-06-06 RX ADMIN — ACETAMINOPHEN 1 MG: 325 TABLET, FILM COATED ORAL at 13:50

## 2019-06-06 RX ADMIN — IPRATROPIUM BROMIDE AND ALBUTEROL SULFATE SCH ML: .5; 3 SOLUTION RESPIRATORY (INHALATION) at 08:20

## 2019-06-06 RX ADMIN — ATORVASTATIN CALCIUM 1 MG: 40 TABLET, FILM COATED ORAL at 20:21

## 2019-06-06 NOTE — PN
Date/Time of Note


Date/Time of Note


DATE: 6/6/19 


TIME: 16:02





Assessment/Plan


VTE Prophylaxis


Risk score (from Ns)>0 risk:  3


SCD applied (from Ns):  Yes


Pharmacological prophylaxis:  NA/contraindicated


Pharm contraindication:  low risk/ambulating





Lines/Catheters


IV Catheter Type (from [facility]):  Peripheral IV


Urinary Cath still in place:  No





Assessment/Plan


Assessment/Plan


is is a 59-year-old female who presented with:


1.  SOB Pneumonia in the lower lung zones. vs CHF


2.  Systemic inflammatory response syndrome secondary to #2.


3.  Hypoxia secondary to #1.


4.  Rule out obstructive sleep apnea.


5.  Hypertension.


6.  Diabetes type 2.


7.  Depression.


8.  Hyperlipidemia.


9.  Hypercholesterolemia.


10.  History of asthma.





pLAN


- cw Lantus to 22  and cw e her mealtime insulin.


-cw Robitussin for as needed cough


-Nebs, solumedrol once 


-cw azithromycin/Rocephin


-Lasix per pulmonary


-Echo EF >65%


-GI/DVT prophylaxis





DISPO PENDING IMPROVEMENT IN RESP STATUS


Result Diagram:  


6/6/19 0542                                                                     


          6/6/19 0542





Results 24hrs





Laboratory Tests


Test
                     6/5/19
17:02  6/5/19
20:32  6/6/19
02:07  6/6/19
05:42


Bedside Glucose                  178           157          239  H


White Blood Count                                                         10.7


Red Blood Count                                                          3.88  L


Hemoglobin                                                               10.3  L


Hematocrit                                                               32.8  L


Mean Corpuscular Volume                                                   84.5


Mean Corpuscular                                                         26.5  L


Hemoglobin


Mean Corpuscular          
             
             
                 31.4  L



Hemoglobin
Concent


Red Cell Distribution                                                    15.4  H


Width


Platelet Count                                                             278


Mean Platelet Volume                                                     11.3  H


Immature Granulocytes %                                                 2.000  H


Neutrophils %                                                             62.8


Lymphocytes %                                                             26.5


Monocytes %                                                                7.3


Eosinophils %                                                              0.8


Basophils %                                                                0.6


Nucleated Red Blood                                                        0.0


Cells %


Immature Granulocytes #                                                 0.210  H


Neutrophils #                                                              6.7


Lymphocytes #                                                              2.8


Monocytes #                                                                0.8


Eosinophils #                                                              0.1


Basophils #                                                                0.1


Nucleated Red Blood                                                        0.0


Cells #


Sodium Level                                                               140


Potassium Level                                                            3.7


Chloride Level                                                             101


Carbon Dioxide Level                                                       32  H


Anion Gap                                                                    7


Blood Urea Nitrogen                                                        23  H


Creatinine                                                                0.56


Est Glomerular Filtrat    
             
             
             > 60  



Rate
mL/min


Glucose Level                                                              203


Calcium Level                                                             8.1  L


Phosphorus Level                                                           4.5


Magnesium Level                                                           1.0  L


Test
                     6/6/19
09:05  6/6/19
12:11  
             



Bedside Glucose                  216           177








Subjective


24 Hr Interval Summary


Free Text/Dictation


slowly  improving however get short of breath at times especially with 


ambulation





Exam/Review of Systems


Exam


Vitals





Vital Signs


  Date      Temp  Pulse  Resp  B/P (MAP)   Pulse Ox  O2          O2 Flow    FiO2


Time                                                 Delivery    Rate


    6/6/19  98.4     83    18      112/70       100


     15:16                           (84)


    6/6/19                                           Nasal             3.0


     14:19                                           Cannula








Intake and Output





6/5/19 6/5/19 6/6/19





1515:00


23:00


07:00





IntakeIntake Total


1900 ml


600 ml





BalanceBalance


1900 ml


600 ml











Exam


GENERAL:  The patient is awake, alert, oriented, does not appear to be any acute


distress.


HEENT:  Pupils equal, round, reactive to light.


NECK:  Supple.


HEART:  Regular rate and rhythm.


LUNGS:  A few scattered rhonchi, some decreased breath sounds bilaterally.


ABDOMEN:  Obese, positive bowel sounds.


EXTREMITIES:  Trace mary





Results


Results 24hrs





Laboratory Tests


Test
                     6/5/19
17:02  6/5/19
20:32  6/6/19
02:07  6/6/19
05:42


Bedside Glucose                  178           157          239  H


White Blood Count                                                         10.7


Red Blood Count                                                          3.88  L


Hemoglobin                                                               10.3  L


Hematocrit                                                               32.8  L


Mean Corpuscular Volume                                                   84.5


Mean Corpuscular                                                         26.5  L


Hemoglobin


Mean Corpuscular          
             
             
                 31.4  L



Hemoglobin
Concent


Red Cell Distribution                                                    15.4  H


Width


Platelet Count                                                             278


Mean Platelet Volume                                                     11.3  H


Immature Granulocytes %                                                 2.000  H


Neutrophils %                                                             62.8


Lymphocytes %                                                             26.5


Monocytes %                                                                7.3


Eosinophils %                                                              0.8


Basophils %                                                                0.6


Nucleated Red Blood                                                        0.0


Cells %


Immature Granulocytes #                                                 0.210  H


Neutrophils #                                                              6.7


Lymphocytes #                                                              2.8


Monocytes #                                                                0.8


Eosinophils #                                                              0.1


Basophils #                                                                0.1


Nucleated Red Blood                                                        0.0


Cells #


Sodium Level                                                               140


Potassium Level                                                            3.7


Chloride Level                                                             101


Carbon Dioxide Level                                                       32  H


Anion Gap                                                                    7


Blood Urea Nitrogen                                                        23  H


Creatinine                                                                0.56


Est Glomerular Filtrat    
             
             
             > 60  



Rate
mL/min


Glucose Level                                                              203


Calcium Level                                                             8.1  L


Phosphorus Level                                                           4.5


Magnesium Level                                                           1.0  L


Test
                     6/6/19
09:05  6/6/19
12:11  
             



Bedside Glucose                  216           177








Medications


Medication





Current Medications


Albuterol/ Ipratropium (Duoneb) 3 ml Q4H RESP  THERAPY HHN  Last administered on


6/6/19at 14:18; Admin Dose 3 ML;  Start 6/4/19 at 05:00


Albuterol/ Ipratropium (Duoneb) 3 ml Q2H RESP THERAPY  PRN HHN SHORTNESS OF 


BREATH;  Start 6/4/19 at 04:00


Acetaminophen (Tylenol Tab) 650 mg Q6H  PRN PO MILD PAIN(1-3)OR ELEVATED TEMP 


Last administered on 6/6/19at 13:50; Admin Dose 650 MG;  Start 6/4/19 at 04:00


Diagnostic Test (Pha) (Accu-Chek) 1 ea 02 XX  Last administered on 6/6/19at 


02:00; Admin Dose 1 EA;  Start 6/5/19 at 02:00


Insulin Aspart (Novolog Insulin Pen) NOVOLOG *MILD* ALGORITHM WITH MEALS  


BEDTIME SC  Last administered on 6/6/19at 12:22; Admin Dose 1 UNIT;  Start 


6/4/19 at 08:00


Miscellaneous Information 1 ea NOTE XX ;  Start 6/4/19 at 04:30


Glucose (Glutose) 15 gm Q15M  PRN PO DECREASED GLUCOSE;  Start 6/4/19 at 04:30


Glucose (Glutose) 22.5 gm Q15M  PRN PO DECREASED GLUCOSE;  Start 6/4/19 at 04:30


Dextrose (D50w Syringe) 25 ml Q15M  PRN IV DECREASED GLUCOSE;  Start 6/4/19 at 


04:30


Dextrose (D50w Syringe) 50 ml Q15M  PRN IV DECREASED GLUCOSE;  Start 6/4/19 at 


04:30


Glucagon (Glucagen) 1 mg Q15M  PRN IM DECREASED GLUCOSE;  Start 6/4/19 at 04:30


Glucose (Glutose) 15 gm Q15M  PRN BUCCAL DECREASED GLUCOSE;  Start 6/4/19 at 


04:30


Atorvastatin Calcium (Lipitor) 40 mg QHS PO  Last administered on 6/5/19at 


21:03; Admin Dose 40 MG;  Start 6/4/19 at 21:00


Bupropion HCl (Wellbutrin Xl) 300 mg DAILY PO  Last administered on 6/6/19at 


09:30; Admin Dose 300 MG;  Start 6/4/19 at 13:15


Fenofibrate (Tricor) 145 mg DAILY PO  Last administered on 6/6/19at 09:30; Admin


Dose 145 MG;  Start 6/4/19 at 13:15


Gabapentin (Neurontin) 300 mg TID PO  Last administered on 6/6/19at 12:17; Admin


Dose 300 MG;  Start 6/4/19 at 13:15


Lisinopril (Zestril) 10 mg DAILY PO  Last administered on 6/6/19at 09:29; Admin 


Dose 10 MG;  Start 6/4/19 at 13:15


Metformin HCl (Glucophage) 1,000 mg WITH BREAKFAST  DINNE PO  Last administered 


on 6/6/19at 09:35; Admin Dose 1,000 MG;  Start 6/4/19 at 18:00


Tramadol HCl (Ultram) 50 mg BID PO  Last administered on 6/6/19at 09:29; Admin 


Dose 50 MG;  Start 6/4/19 at 13:15


Venlafaxine HCl (Effexor Xr) 150 mg DAILY PO  Last administered on 6/6/19at 


09:30; Admin Dose 150 MG;  Start 6/4/19 at 13:30


Diagnostic Test (Pha) (Accu-Chek) 1 ea AC MEALS AND  BEDTIME XX  Last 


administered on 6/6/19at 12:17; Admin Dose 1 EA;  Start 6/4/19 at 17:30


Furosemide (Lasix) 40 mg DAILY IV  Last administered on 6/6/19at 09:30; Admin 


Dose 40 MG;  Start 6/4/19 at 15:00


Zolpidem Tartrate (Ambien) 5 mg HS  PRN PO INSOMNIA Last administered on 


6/4/19at 22:06; Admin Dose 5 MG;  Start 6/4/19 at 22:00


Ceftriaxone Sodium 50 ml @  100 mls/hr Q24H IVPB  Last administered on 6/5/19at 


16:15; Admin Dose 100 MLS/HR;  Start 6/5/19 at 16:00


Insulin Aspart (Novolog Insulin Pen) 7 unit WITH  MEALS SC  Last administered on


6/6/19at 12:17; Admin Dose 7 UNIT;  Start 6/5/19 at 18:00


Insulin Glargine (Lantus) 22 units DAILY@2000 SC  Last administered on 6/5/19at 


21:06; Admin Dose 22 UNITS;  Start 6/5/19 at 20:00


Guaifenesin/ Dextromethorphan (Robitussin Dm Liquid Cup) 10 ml Q4H  PRN PO 


COUGH;  Start 6/5/19 at 15:00


Azithromycin 250 mg/Sodium Chloride 250 ml @  250 mls/hr Q24H IVPB  Last 


administered on 6/5/19at 17:04; Admin Dose 250 MLS/HR;  Start 6/5/19 at 16:00


Famotidine (Pepcid) 20 mg BID PO  Last administered on 6/6/19at 09:30; Admin 


Dose 20 MG;  Start 6/5/19 at 21:00


Magnesium Sulfate 100 ml @  25 mls/hr ONCE  ONCE IVPB  Last administered on 


6/6/19at 13:50; Admin Dose 25 MLS/HR;  Start 6/6/19 at 13:00;  Stop 6/6/19 at 1


6:59











LESLEY WEISS MD               Jun 6, 2019 16:04

## 2019-06-06 NOTE — CONS
Assessment/Plan


Assessment/Plan


Assessment/Plan (Daily)


Assessment and recommendations;





1.  Patient admitted for acute bronchitis and CHF exacerbation with interval 


clinical improvement.


2.  Other comorbidities include history of diabetes, hypertension, arthritis and


chronic anemia.





Continue current supportive care.





Consultation Date/Type/Reason


Admit Date/Time


Jun 4, 2019 at 03:10


Initial Consult Date





Type of Consult


Pulmonary


Date/Time of Note


DATE: 6/6/19 


TIME: 10:25





24 HR Interval Summary


Free Text/Dictation


Patient's condition is stable.  Reports improving shortness of breath.  Denies 


any coughing wheezing any sputum production.


General exam; middle-aged female, awake alert, currently in no distress.





Exam/Review of Systems


Exam


Vitals





Vital Signs


  Date      Temp  Pulse  Resp  B/P (MAP)   Pulse Ox  O2          O2 Flow    FiO2


Time                                                 Delivery    Rate


    6/6/19           90    18                    98  Nasal             3.0


     08:25                                           Cannula


    6/6/19  98.2                   118/62


     08:00                           (80)








Intake and Output





6/5/19 6/5/19 6/6/19





1515:00


23:00


07:00





IntakeIntake Total


1900 ml


600 ml





BalanceBalance


1900 ml


600 ml











Exam


H EENT exam; supple neck, no JVD.  No lymphadenopathy.  Midline trachea.  No 


thyromegaly.  Patient has fair dentition.  No neck masses.


Chest exam; diminished but clear breath sounds.  S1-S2 audible, no murmurs.  


Regular rhythm.


Abdomen exam; soft, nontender.  No organomegaly.  Bowel sounds audible.


Extremity exam; no peripheral edema clubbing.  CNS exam; no focal deficit.





Results


Result Diagram:  


6/6/19 0542                                                                     


          6/6/19 0542





Results 24hrs





Laboratory Tests


Test
                     6/5/19
11:41  6/5/19
17:02  6/5/19
20:32  6/6/19
02:07


Bedside Glucose                 248  H         178           157          239  H


Test
                     6/6/19
05:42  6/6/19
09:05  
             



White Blood Count               10.7


Red Blood Count                3.88  L


Hemoglobin                     10.3  L


Hematocrit                     32.8  L


Mean Corpuscular Volume         84.5


Mean Corpuscular               26.5  L


Hemoglobin


Mean Corpuscular              31.4  L
  
             
             



Hemoglobin
Concent


Red Cell Distribution          15.4  H


Width


Platelet Count                   278


Mean Platelet Volume           11.3  H


Immature Granulocytes %       2.000  H


Neutrophils %                   62.8


Lymphocytes %                   26.5


Monocytes %                      7.3


Eosinophils %                    0.8


Basophils %                      0.6


Nucleated Red Blood              0.0


Cells %


Immature Granulocytes #       0.210  H


Neutrophils #                    6.7


Lymphocytes #                    2.8


Monocytes #                      0.8


Eosinophils #                    0.1


Basophils #                      0.1


Nucleated Red Blood              0.0


Cells #


Sodium Level                     140


Potassium Level                  3.7


Chloride Level                   101


Carbon Dioxide Level             32  H


Anion Gap                          7


Blood Urea Nitrogen              23  H


Creatinine                      0.56


Est Glomerular Filtrat    > 60  
       
             
             



Rate
mL/min


Glucose Level                    203


Calcium Level                   8.1  L


Phosphorus Level                 4.5


Magnesium Level                 1.0  L


Bedside Glucose                                216








Medications


Medication





Current Medications


Albuterol/ Ipratropium (Duoneb) 3 ml Q4H RESP  THERAPY HHN  Last administered on


6/6/19at 08:20; Admin Dose 3 ML;  Start 6/4/19 at 05:00


Albuterol/ Ipratropium (Duoneb) 3 ml Q2H RESP THERAPY  PRN HHN SHORTNESS OF 


BREATH;  Start 6/4/19 at 04:00


Acetaminophen (Tylenol Tab) 650 mg Q6H  PRN PO MILD PAIN(1-3)OR ELEVATED TEMP;  


Start 6/4/19 at 04:00


Diagnostic Test (Pha) (Accu-Chek) 1 ea 02 XX  Last administered on 6/6/19at 


02:00; Admin Dose 1 EA;  Start 6/5/19 at 02:00


Insulin Aspart (Novolog Insulin Pen) NOVOLOG *MILD* ALGORITHM WITH MEALS  


BEDTIME SC  Last administered on 6/6/19at 09:11; Admin Dose 2 UNIT;  Start 


6/4/19 at 08:00


Miscellaneous Information 1 ea NOTE XX ;  Start 6/4/19 at 04:30


Glucose (Glutose) 15 gm Q15M  PRN PO DECREASED GLUCOSE;  Start 6/4/19 at 04:30


Glucose (Glutose) 22.5 gm Q15M  PRN PO DECREASED GLUCOSE;  Start 6/4/19 at 04:30


Dextrose (D50w Syringe) 25 ml Q15M  PRN IV DECREASED GLUCOSE;  Start 6/4/19 at 


04:30


Dextrose (D50w Syringe) 50 ml Q15M  PRN IV DECREASED GLUCOSE;  Start 6/4/19 at 


04:30


Glucagon (Glucagen) 1 mg Q15M  PRN IM DECREASED GLUCOSE;  Start 6/4/19 at 04:30


Glucose (Glutose) 15 gm Q15M  PRN BUCCAL DECREASED GLUCOSE;  Start 6/4/19 at 04:


30


Atorvastatin Calcium (Lipitor) 40 mg QHS PO  Last administered on 6/5/19at 


21:03; Admin Dose 40 MG;  Start 6/4/19 at 21:00


Bupropion HCl (Wellbutrin Xl) 300 mg DAILY PO  Last administered on 6/6/19at 


09:30; Admin Dose 300 MG;  Start 6/4/19 at 13:15


Fenofibrate (Tricor) 145 mg DAILY PO  Last administered on 6/6/19at 09:30; Admin


Dose 145 MG;  Start 6/4/19 at 13:15


Gabapentin (Neurontin) 300 mg TID PO  Last administered on 6/6/19at 09:29; Admin


Dose 300 MG;  Start 6/4/19 at 13:15


Lisinopril (Zestril) 10 mg DAILY PO  Last administered on 6/6/19at 09:29; Admin 


Dose 10 MG;  Start 6/4/19 at 13:15


Metformin HCl (Glucophage) 1,000 mg WITH BREAKFAST  DINNE PO  Last administered 


on 6/6/19at 09:35; Admin Dose 1,000 MG;  Start 6/4/19 at 18:00


Tramadol HCl (Ultram) 50 mg BID PO  Last administered on 6/6/19at 09:29; Admin 


Dose 50 MG;  Start 6/4/19 at 13:15


Venlafaxine HCl (Effexor Xr) 150 mg DAILY PO  Last administered on 6/6/19at 


09:30; Admin Dose 150 MG;  Start 6/4/19 at 13:30


Diagnostic Test (Pha) (Accu-Chek) 1 ea AC MEALS AND  BEDTIME XX  Last 


administered on 6/6/19at 07:00; Admin Dose 1 EA;  Start 6/4/19 at 17:30


Furosemide (Lasix) 40 mg DAILY IV  Last administered on 6/6/19at 09:30; Admin 


Dose 40 MG;  Start 6/4/19 at 15:00


Zolpidem Tartrate (Ambien) 5 mg HS  PRN PO INSOMNIA Last administered on 


6/4/19at 22:06; Admin Dose 5 MG;  Start 6/4/19 at 22:00


Ceftriaxone Sodium 50 ml @  100 mls/hr Q24H IVPB  Last administered on 6/5/19at 


16:15; Admin Dose 100 MLS/HR;  Start 6/5/19 at 16:00


Insulin Aspart (Novolog Insulin Pen) 7 unit WITH  MEALS SC  Last administered on


6/6/19at 09:11; Admin Dose 7 UNIT;  Start 6/5/19 at 18:00


Insulin Glargine (Lantus) 22 units DAILY@2000 SC  Last administered on 6/5/19at 


21:06; Admin Dose 22 UNITS;  Start 6/5/19 at 20:00


Guaifenesin/ Dextromethorphan (Robitussin Dm Liquid Cup) 10 ml Q4H  PRN PO 


COUGH;  Start 6/5/19 at 15:00


Azithromycin 250 mg/Sodium Chloride 250 ml @  250 mls/hr Q24H IVPB  Last 


administered on 6/5/19at 17:04; Admin Dose 250 MLS/HR;  Start 6/5/19 at 16:00


Famotidine (Pepcid) 20 mg BID PO  Last administered on 6/6/19at 09:30; Admin 


Dose 20 MG;  Start 6/5/19 at 21:00











KAJAL MANSFIELD                     Jun 6, 2019 10:27

## 2019-06-07 VITALS — HEART RATE: 91 BPM | SYSTOLIC BLOOD PRESSURE: 115 MMHG | RESPIRATION RATE: 18 BRPM | DIASTOLIC BLOOD PRESSURE: 60 MMHG

## 2019-06-07 VITALS — RESPIRATION RATE: 19 BRPM | SYSTOLIC BLOOD PRESSURE: 119 MMHG | HEART RATE: 96 BPM | DIASTOLIC BLOOD PRESSURE: 66 MMHG

## 2019-06-07 VITALS — DIASTOLIC BLOOD PRESSURE: 67 MMHG | SYSTOLIC BLOOD PRESSURE: 138 MMHG | HEART RATE: 95 BPM | RESPIRATION RATE: 17 BRPM

## 2019-06-07 VITALS — DIASTOLIC BLOOD PRESSURE: 70 MMHG | RESPIRATION RATE: 20 BRPM | HEART RATE: 101 BPM | SYSTOLIC BLOOD PRESSURE: 135 MMHG

## 2019-06-07 LAB
ANION GAP: 9 (ref 5–13)
BLOOD UREA NITROGEN: 22 MG/DL (ref 7–20)
CALCIUM: 8.7 MG/DL (ref 8.4–10.2)
CARBON DIOXIDE: 30 MMOL/L (ref 21–31)
CHLORIDE: 101 MMOL/L (ref 97–110)
CREATININE: 0.6 MG/DL (ref 0.44–1)
GLUCOSE: 375 MG/DL (ref 70–220)
POTASSIUM: 4.9 MMOL/L (ref 3.5–5.1)
SODIUM: 140 MMOL/L (ref 135–144)

## 2019-06-07 RX ADMIN — FENOFIBRATE 1 MG: 145 TABLET, FILM COATED ORAL at 08:32

## 2019-06-07 RX ADMIN — VENLAFAXINE HYDROCHLORIDE SCH MG: 75 CAPSULE, EXTENDED RELEASE ORAL at 09:46

## 2019-06-07 RX ADMIN — IPRATROPIUM BROMIDE AND ALBUTEROL SULFATE 1 ML: .5; 3 SOLUTION RESPIRATORY (INHALATION) at 16:39

## 2019-06-07 RX ADMIN — INSULIN ASPART 1 UNIT: 100 INJECTION, SOLUTION INTRAVENOUS; SUBCUTANEOUS at 08:35

## 2019-06-07 RX ADMIN — LISINOPRIL 1 MG: 10 TABLET ORAL at 08:32

## 2019-06-07 RX ADMIN — IPRATROPIUM BROMIDE AND ALBUTEROL SULFATE SCH ML: .5; 3 SOLUTION RESPIRATORY (INHALATION) at 20:27

## 2019-06-07 RX ADMIN — INSULIN GLARGINE SCH UNITS: 100 INJECTION, SOLUTION SUBCUTANEOUS at 21:07

## 2019-06-07 RX ADMIN — FAMOTIDINE SCH MG: 20 TABLET ORAL at 21:01

## 2019-06-07 RX ADMIN — FAMOTIDINE 1 MG: 20 TABLET ORAL at 21:01

## 2019-06-07 RX ADMIN — IPRATROPIUM BROMIDE AND ALBUTEROL SULFATE SCH ML: .5; 3 SOLUTION RESPIRATORY (INHALATION) at 08:11

## 2019-06-07 RX ADMIN — IPRATROPIUM BROMIDE AND ALBUTEROL SULFATE SCH ML: .5; 3 SOLUTION RESPIRATORY (INHALATION) at 02:00

## 2019-06-07 RX ADMIN — LINAGLIPTIN SCH MG: 5 TABLET, FILM COATED ORAL at 13:00

## 2019-06-07 RX ADMIN — IPRATROPIUM BROMIDE AND ALBUTEROL SULFATE SCH ML: .5; 3 SOLUTION RESPIRATORY (INHALATION) at 04:57

## 2019-06-07 RX ADMIN — IPRATROPIUM BROMIDE AND ALBUTEROL SULFATE 1 ML: .5; 3 SOLUTION RESPIRATORY (INHALATION) at 20:27

## 2019-06-07 RX ADMIN — DEXTROSE MONOHYDRATE 1 MLS/HR: 5 INJECTION, SOLUTION INTRAVENOUS at 17:08

## 2019-06-07 RX ADMIN — ZOLPIDEM TARTRATE PRN MG: 5 TABLET, FILM COATED ORAL at 00:40

## 2019-06-07 RX ADMIN — INSULIN ASPART 1 UNIT: 100 INJECTION, SOLUTION INTRAVENOUS; SUBCUTANEOUS at 21:07

## 2019-06-07 RX ADMIN — GABAPENTIN SCH MG: 300 CAPSULE ORAL at 08:31

## 2019-06-07 RX ADMIN — IPRATROPIUM BROMIDE AND ALBUTEROL SULFATE 1 ML: .5; 3 SOLUTION RESPIRATORY (INHALATION) at 12:31

## 2019-06-07 RX ADMIN — FENOFIBRATE SCH MG: 145 TABLET ORAL at 08:32

## 2019-06-07 RX ADMIN — INSULIN GLARGINE 1 UNITS: 100 INJECTION, SOLUTION SUBCUTANEOUS at 21:07

## 2019-06-07 RX ADMIN — FUROSEMIDE 1 MG: 10 INJECTION, SOLUTION INTRAVENOUS at 08:30

## 2019-06-07 RX ADMIN — INSULIN ASPART 1 UNIT: 100 INJECTION, SOLUTION INTRAVENOUS; SUBCUTANEOUS at 03:59

## 2019-06-07 RX ADMIN — INSULIN ASPART 1 UNIT: 100 INJECTION, SOLUTION INTRAVENOUS; SUBCUTANEOUS at 13:03

## 2019-06-07 RX ADMIN — IPRATROPIUM BROMIDE AND ALBUTEROL SULFATE SCH ML: .5; 3 SOLUTION RESPIRATORY (INHALATION) at 12:31

## 2019-06-07 RX ADMIN — BUPROPION HYDROCHLORIDE SCH MG: 150 TABLET, EXTENDED RELEASE ORAL at 08:30

## 2019-06-07 RX ADMIN — GABAPENTIN 1 MG: 300 CAPSULE ORAL at 13:00

## 2019-06-07 RX ADMIN — ATORVASTATIN CALCIUM 1 MG: 40 TABLET, FILM COATED ORAL at 21:01

## 2019-06-07 RX ADMIN — IPRATROPIUM BROMIDE AND ALBUTEROL SULFATE 1 ML: .5; 3 SOLUTION RESPIRATORY (INHALATION) at 02:00

## 2019-06-07 RX ADMIN — ZOLPIDEM TARTRATE 1 MG: 5 TABLET, FILM COATED ORAL at 00:40

## 2019-06-07 RX ADMIN — VENLAFAXINE HYDROCHLORIDE 1 MG: 75 CAPSULE, EXTENDED RELEASE ORAL at 09:46

## 2019-06-07 RX ADMIN — IPRATROPIUM BROMIDE AND ALBUTEROL SULFATE 1 ML: .5; 3 SOLUTION RESPIRATORY (INHALATION) at 04:57

## 2019-06-07 RX ADMIN — ATORVASTATIN CALCIUM SCH MG: 40 TABLET, FILM COATED ORAL at 21:01

## 2019-06-07 RX ADMIN — GABAPENTIN 1 MG: 300 CAPSULE ORAL at 21:01

## 2019-06-07 RX ADMIN — FUROSEMIDE 1 MG: 10 INJECTION, SOLUTION INTRAVENOUS at 16:20

## 2019-06-07 RX ADMIN — IPRATROPIUM BROMIDE AND ALBUTEROL SULFATE 1 ML: .5; 3 SOLUTION RESPIRATORY (INHALATION) at 08:11

## 2019-06-07 RX ADMIN — LISINOPRIL SCH MG: 10 TABLET ORAL at 08:32

## 2019-06-07 RX ADMIN — GABAPENTIN SCH MG: 300 CAPSULE ORAL at 21:01

## 2019-06-07 RX ADMIN — GUAIFENESIN AND DEXTROMETHORPHAN 1 ML: 100; 10 SYRUP ORAL at 21:29

## 2019-06-07 RX ADMIN — FAMOTIDINE SCH MG: 20 TABLET ORAL at 08:32

## 2019-06-07 RX ADMIN — INSULIN ASPART 1 UNIT: 100 INJECTION, SOLUTION INTRAVENOUS; SUBCUTANEOUS at 03:45

## 2019-06-07 RX ADMIN — INSULIN ASPART 1 UNIT: 100 INJECTION, SOLUTION INTRAVENOUS; SUBCUTANEOUS at 08:36

## 2019-06-07 RX ADMIN — BUPROPION HYDROCHLORIDE 1 MG: 150 TABLET, EXTENDED RELEASE ORAL at 08:30

## 2019-06-07 RX ADMIN — INSULIN ASPART 1 UNIT: 100 INJECTION, SOLUTION INTRAVENOUS; SUBCUTANEOUS at 18:22

## 2019-06-07 RX ADMIN — GABAPENTIN 1 MG: 300 CAPSULE ORAL at 08:31

## 2019-06-07 RX ADMIN — AZITHROMYCIN MONOHYDRATE SCH MLS/HR: 500 INJECTION, POWDER, LYOPHILIZED, FOR SOLUTION INTRAVENOUS at 17:08

## 2019-06-07 RX ADMIN — IPRATROPIUM BROMIDE AND ALBUTEROL SULFATE SCH ML: .5; 3 SOLUTION RESPIRATORY (INHALATION) at 16:39

## 2019-06-07 RX ADMIN — CEFTRIAXONE SCH MLS/HR: 1 INJECTION, SOLUTION INTRAVENOUS at 16:13

## 2019-06-07 RX ADMIN — CEFTRIAXONE 1 MLS/HR: 1 INJECTION, SOLUTION INTRAVENOUS at 16:13

## 2019-06-07 RX ADMIN — GABAPENTIN SCH MG: 300 CAPSULE ORAL at 13:00

## 2019-06-07 RX ADMIN — INSULIN ASPART 1 UNIT: 100 INJECTION, SOLUTION INTRAVENOUS; SUBCUTANEOUS at 13:02

## 2019-06-07 RX ADMIN — FAMOTIDINE 1 MG: 20 TABLET ORAL at 08:32

## 2019-06-07 RX ADMIN — LINAGLIPTIN 1 MG: 5 TABLET, FILM COATED ORAL at 13:00

## 2019-06-07 NOTE — PN
Date/Time of Note


Date/Time of Note


DATE: 6/7/19 


TIME: 11:23





Assessment/Plan


VTE Prophylaxis


Risk score (from Ns)>0 risk:  3


SCD applied (from Ns):  Yes


Pharmacological prophylaxis:  NA/contraindicated


Pharm contraindication:  low risk/ambulating





Lines/Catheters


IV Catheter Type (from [facility]):  Saline Lock


Urinary Cath still in place:  No





Assessment/Plan


Hospital Course


1.  SOB Pneumonia in the lower lung zones. vs CHF. Chest xray is clear


2.  Systemic inflammatory response syndrome secondary to #2.


3.  Hypoxia secondary to #1.


4.  Rule out obstructive sleep apnea.


5.  Hypertension.


6.  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


7.  Depression.


8.  Hyperlipidemia.


9.  Hypercholesterolemia.


10.  History of asthma.


11. Obesity


12. Normocytic hypochromic anemia


Assessment/Plan


- cw Lantus to 22  and cw  7 units Aspart with  her mealtime .


-start Tradjenta


-cw Robitussin for as needed cough


-c/w Nebs


-cw azithromycin/Rocephin


-Lasix per pulmonary


-Echo EF >65%


-GI proph. Famotidine.


-DVT prophylaxis SCD bilaterally


Result Diagram:  


6/6/19 0542                                                                     


          6/7/19 0441





Results 24hrs





Laboratory Tests


Test
                     6/6/19
12:11  6/6/19
16:57  6/6/19
20:19  6/7/19
01:59


Bedside Glucose                  177           132           192          390  H


Test
                     6/7/19
03:40  6/7/19
04:41  6/7/19
05:57  6/7/19
08:29


Bedside Glucose                428  *H                      319  H        256  H


Sodium Level                                   140


Potassium Level                                4.9


Chloride Level                                 101


Carbon Dioxide Level                            30


Anion Gap                                        9


Blood Urea Nitrogen                            22  H


Creatinine                                    0.60


Est Glomerular Filtrat    
             > 60  
       
             



Rate
mL/min


Glucose Level                                375  #H


Calcium Level                                  8.7








Subjective


24 Hr Interval Summary


Musculoskeletal:  bone/joint pain (bilateral knee paien)


Skin:  other (decreased bilateral skin sensorium)





Exam/Review of Systems


Exam


Vitals





Vital Signs


  Date      Temp  Pulse  Resp  B/P (MAP)   Pulse Ox  O2          O2 Flow    FiO2


Time                                                 Delivery    Rate


    6/7/19                                           Nasal             3.0


     11:17                                           Cannula


    6/7/19           91    18                    94


     08:15


    6/7/19  98.4                   134/66


     07:26                           (88)








Intake and Output





6/6/19


6/6/19


6/7/19





1515:00


23:00


07:00





IntakeIntake Total


1150 ml


300 ml





BalanceBalance


1150 ml


300 ml











Constitutional:  alert, oriented


Neck:  supple


Respiratory:  diminished breath sounds, other (rhonchi)


Cardiovascular:  regular rate and rhythm


Gastrointestinal:  soft


Musculoskeletal:  joint tenderness (bilateral knee)





Results


Results 24hrs





Laboratory Tests


Test
                     6/6/19
12:11  6/6/19
16:57  6/6/19
20:19  6/7/19
01:59


Bedside Glucose                  177           132           192          390  H


Test
                     6/7/19
03:40  6/7/19
04:41  6/7/19
05:57  6/7/19
08:29


Bedside Glucose                428  *H                      319  H        256  H


Sodium Level                                   140


Potassium Level                                4.9


Chloride Level                                 101


Carbon Dioxide Level                            30


Anion Gap                                        9


Blood Urea Nitrogen                            22  H


Creatinine                                    0.60


Est Glomerular Filtrat    
             > 60  
       
             



Rate
mL/min


Glucose Level                                375  #H


Calcium Level                                  8.7








Medications


Medication





Current Medications


Albuterol/ Ipratropium (Duoneb) 3 ml Q4H RESP  THERAPY HHN  Last administered on


6/7/19at 08:11; Admin Dose 3 ML;  Start 6/4/19 at 05:00


Albuterol/ Ipratropium (Duoneb) 3 ml Q2H RESP THERAPY  PRN HHN SHORTNESS OF 


BREATH;  Start 6/4/19 at 04:00


Acetaminophen (Tylenol Tab) 650 mg Q6H  PRN PO MILD PAIN(1-3)OR ELEVATED TEMP 


Last administered on 6/6/19at 13:50; Admin Dose 650 MG;  Start 6/4/19 at 04:00


Diagnostic Test (Pha) (Accu-Chek) 1 ea 02 XX  Last administered on 6/6/19at 


02:00; Admin Dose 1 EA;  Start 6/5/19 at 02:00


Insulin Aspart (Novolog Insulin Pen) NOVOLOG *MILD* ALGORITHM WITH MEALS  


BEDTIME SC  Last administered on 6/7/19at 08:36; Admin Dose 4 UNIT;  Start 


6/4/19 at 08:00


Miscellaneous Information 1 ea NOTE XX ;  Start 6/4/19 at 04:30


Glucose (Glutose) 15 gm Q15M  PRN PO DECREASED GLUCOSE;  Start 6/4/19 at 04:30


Glucose (Glutose) 22.5 gm Q15M  PRN PO DECREASED GLUCOSE;  Start 6/4/19 at 04:30


Dextrose (D50w Syringe) 25 ml Q15M  PRN IV DECREASED GLUCOSE;  Start 6/4/19 at 


04:30


Dextrose (D50w Syringe) 50 ml Q15M  PRN IV DECREASED GLUCOSE;  Start 6/4/19 at 


04:30


Glucagon (Glucagen) 1 mg Q15M  PRN IM DECREASED GLUCOSE;  Start 6/4/19 at 04:30


Glucose (Glutose) 15 gm Q15M  PRN BUCCAL DECREASED GLUCOSE;  Start 6/4/19 at 


04:30


Atorvastatin Calcium (Lipitor) 40 mg QHS PO  Last administered on 6/6/19at 


20:21; Admin Dose 40 MG;  Start 6/4/19 at 21:00


Bupropion HCl (Wellbutrin Xl) 300 mg DAILY PO  Last administered on 6/7/19at 


08:30; Admin Dose 300 MG;  Start 6/4/19 at 13:15


Fenofibrate (Tricor) 145 mg DAILY PO  Last administered on 6/7/19at 08:32; Admin


Dose 145 MG;  Start 6/4/19 at 13:15


Gabapentin (Neurontin) 300 mg TID PO  Last administered on 6/7/19at 08:31; Admin


Dose 300 MG;  Start 6/4/19 at 13:15


Lisinopril (Zestril) 10 mg DAILY PO  Last administered on 6/7/19at 08:32; Admin 


Dose 10 MG;  Start 6/4/19 at 13:15


Metformin HCl (Glucophage) 1,000 mg WITH BREAKFAST  DINNE PO  Last administered 


on 6/7/19at 08:45; Admin Dose 1,000 MG;  Start 6/4/19 at 18:00


Tramadol HCl (Ultram) 50 mg BID PO  Last administered on 6/7/19at 08:31; Admin 


Dose 50 MG;  Start 6/4/19 at 13:15


Venlafaxine HCl (Effexor Xr) 150 mg DAILY PO  Last administered on 6/7/19at 


09:46; Admin Dose 150 MG;  Start 6/4/19 at 13:30


Diagnostic Test (Pha) (Accu-Chek) 1 ea AC MEALS AND  BEDTIME XX  Last 


administered on 6/7/19at 03:51; Admin Dose 1 EA;  Start 6/4/19 at 17:30


Furosemide (Lasix) 40 mg DAILY IV  Last administered on 6/6/19at 09:30; Admin 


Dose 40 MG;  Start 6/4/19 at 15:00


Zolpidem Tartrate (Ambien) 5 mg HS  PRN PO INSOMNIA Last administered on 


6/7/19at 00:40; Admin Dose 5 MG;  Start 6/4/19 at 22:00


Ceftriaxone Sodium 50 ml @  100 mls/hr Q24H IVPB  Last administered on 6/6/19at 


16:52; Admin Dose 100 MLS/HR;  Start 6/5/19 at 16:00


Insulin Aspart (Novolog Insulin Pen) 7 unit WITH  MEALS SC  Last administered on


6/7/19at 08:35; Admin Dose 7 UNIT;  Start 6/5/19 at 18:00


Insulin Glargine (Lantus) 22 units DAILY@2000 SC  Last administered on 6/6/19at 


20:29; Admin Dose 22 UNITS;  Start 6/5/19 at 20:00


Guaifenesin/ Dextromethorphan (Robitussin Dm Liquid Cup) 10 ml Q4H  PRN PO C


OUGH;  Start 6/5/19 at 15:00


Azithromycin 250 mg/Sodium Chloride 250 ml @  250 mls/hr Q24H IVPB  Last 


administered on 6/6/19at 16:52; Admin Dose 250 MLS/HR;  Start 6/5/19 at 16:00


Famotidine (Pepcid) 20 mg BID PO  Last administered on 6/7/19at 08:32; Admin 


Dose 20 MG;  Start 6/5/19 at 21:00











ZELDA BEATTY                 Jun 7, 2019 11:30

## 2019-06-07 NOTE — CONS
Consult Date/Type/Reason


Admit Date/Time


Jun 4, 2019 at 03:10


Initial Consult Date





Type of Consult


Pulmonary


Date/Time of Note


DATE: 6/7/19 


TIME: 13:26





Subjective


Patient better today.  Less shortness of breath less congestion on 1 to 2 L 


supplemental O2.





Objective





Vital Signs


  Date      Temp  Pulse  Resp  B/P (MAP)   Pulse Ox  O2          O2 Flow    FiO2


Time                                                 Delivery    Rate


    6/7/19           93    18                    98  Nasal             2.0


     12:32                                           Cannula


    6/7/19  98.4                   134/66


     07:26                           (88)








Intake and Output





6/6/19 6/6/19 6/7/19





1515:00


23:00


07:00





IntakeIntake Total


1150 ml


300 ml





BalanceBalance


1150 ml


300 ml











Exam


GENERAL: Well-nourished well-developed lady comfortable at rest


VITAL SIGNS:  per chart


NECK:  Supple.  No JVD or lymphadenopathy.


CARDIAC EXAM:  S1, S2. No added sounds or murmurs.


CHEST:  clear bilaterally, No added sounds, rales or wheezes


ABDOMEN:  Soft, nontender.  No guarding or rebound.


EXTREMITIES:  No cyanosis, clubbing or edema.


NEUROLOGIC:  Generalized weakness.  No focal deficits.





Results/Medications


Result Diagram:  


6/6/19 0542                                                                     


          6/7/19 0441





Results 24 hrs





Laboratory Tests


Test
                     6/6/19
16:57  6/6/19
20:19  6/7/19
01:59  6/7/19
03:40


Bedside Glucose                  132           192          390  H       428  *H


Test
                     6/7/19
04:41  6/7/19
05:57  6/7/19
08:29  6/7/19
12:59


Sodium Level                     140


Potassium Level                  4.9


Chloride Level                   101


Carbon Dioxide Level              30


Anion Gap                          9


Blood Urea Nitrogen              22  H


Creatinine                      0.60


Est Glomerular Filtrat    > 60  
       
             
             



Rate
mL/min


Glucose Level                  375  #H


Calcium Level                    8.7


Bedside Glucose                               319  H        256  H         166





Medications





Current Medications


Albuterol/ Ipratropium (Duoneb) 3 ml Q4H RESP  THERAPY HHN  Last administered on


6/7/19at 12:31; Admin Dose 3 ML;  Start 6/4/19 at 05:00


Albuterol/ Ipratropium (Duoneb) 3 ml Q2H RESP THERAPY  PRN HHN SHORTNESS OF 


BREATH;  Start 6/4/19 at 04:00


Acetaminophen (Tylenol Tab) 650 mg Q6H  PRN PO MILD PAIN(1-3)OR ELEVATED TEMP 


Last administered on 6/6/19at 13:50; Admin Dose 650 MG;  Start 6/4/19 at 04:00


Diagnostic Test (Pha) (Accu-Chek) 1 ea 02 XX  Last administered on 6/6/19at 


02:00; Admin Dose 1 EA;  Start 6/5/19 at 02:00


Insulin Aspart (Novolog Insulin Pen) NOVOLOG *MILD* ALGORITHM WITH MEALS  BE


DTIME SC  Last administered on 6/7/19at 13:02; Admin Dose 1 UNIT;  Start 6/4/19 


at 08:00


Miscellaneous Information 1 ea NOTE XX ;  Start 6/4/19 at 04:30


Glucose (Glutose) 15 gm Q15M  PRN PO DECREASED GLUCOSE;  Start 6/4/19 at 04:30


Glucose (Glutose) 22.5 gm Q15M  PRN PO DECREASED GLUCOSE;  Start 6/4/19 at 04:30


Dextrose (D50w Syringe) 25 ml Q15M  PRN IV DECREASED GLUCOSE;  Start 6/4/19 at 


04:30


Dextrose (D50w Syringe) 50 ml Q15M  PRN IV DECREASED GLUCOSE;  Start 6/4/19 at 


04:30


Glucagon (Glucagen) 1 mg Q15M  PRN IM DECREASED GLUCOSE;  Start 6/4/19 at 04:30


Glucose (Glutose) 15 gm Q15M  PRN BUCCAL DECREASED GLUCOSE;  Start 6/4/19 at 


04:30


Atorvastatin Calcium (Lipitor) 40 mg QHS PO  Last administered on 6/6/19at 


20:21; Admin Dose 40 MG;  Start 6/4/19 at 21:00


Bupropion HCl (Wellbutrin Xl) 300 mg DAILY PO  Last administered on 6/7/19at 


08:30; Admin Dose 300 MG;  Start 6/4/19 at 13:15


Fenofibrate (Tricor) 145 mg DAILY PO  Last administered on 6/7/19at 08:32; Admin


Dose 145 MG;  Start 6/4/19 at 13:15


Gabapentin (Neurontin) 300 mg TID PO  Last administered on 6/7/19at 13:00; Admin


Dose 300 MG;  Start 6/4/19 at 13:15


Lisinopril (Zestril) 10 mg DAILY PO  Last administered on 6/7/19at 08:32; Admin 


Dose 10 MG;  Start 6/4/19 at 13:15


Metformin HCl (Glucophage) 1,000 mg WITH BREAKFAST  DINNE PO  Last administered 


on 6/7/19at 08:45; Admin Dose 1,000 MG;  Start 6/4/19 at 18:00


Tramadol HCl (Ultram) 50 mg BID PO  Last administered on 6/7/19at 08:31; Admin 


Dose 50 MG;  Start 6/4/19 at 13:15


Venlafaxine HCl (Effexor Xr) 150 mg DAILY PO  Last administered on 6/7/19at 


09:46; Admin Dose 150 MG;  Start 6/4/19 at 13:30


Diagnostic Test (Pha) (Accu-Chek) 1 ea AC MEALS AND  BEDTIME XX  Last admi


nistered on 6/7/19at 13:05; Admin Dose 1 EA;  Start 6/4/19 at 17:30


Furosemide (Lasix) 40 mg DAILY IV  Last administered on 6/6/19at 09:30; Admin 


Dose 40 MG;  Start 6/4/19 at 15:00


Zolpidem Tartrate (Ambien) 5 mg HS  PRN PO INSOMNIA Last administered on 


6/7/19at 00:40; Admin Dose 5 MG;  Start 6/4/19 at 22:00


Ceftriaxone Sodium 50 ml @  100 mls/hr Q24H IVPB  Last administered on 6/6/19at 


16:52; Admin Dose 100 MLS/HR;  Start 6/5/19 at 16:00


Insulin Aspart (Novolog Insulin Pen) 7 unit WITH  MEALS SC  Last administered on


6/7/19at 13:03; Admin Dose 7 UNIT;  Start 6/5/19 at 18:00


Insulin Glargine (Lantus) 22 units DAILY@2000 SC  Last administered on 6/6/19at 


20:29; Admin Dose 22 UNITS;  Start 6/5/19 at 20:00


Guaifenesin/ Dextromethorphan (Robitussin Dm Liquid Cup) 10 ml Q4H  PRN PO 


COUGH;  Start 6/5/19 at 15:00


Azithromycin 250 mg/Sodium Chloride 250 ml @  250 mls/hr Q24H IVPB  Last 


administered on 6/6/19at 16:52; Admin Dose 250 MLS/HR;  Start 6/5/19 at 16:00


Famotidine (Pepcid) 20 mg BID PO  Last administered on 6/7/19at 08:32; Admin 


Dose 20 MG;  Start 6/5/19 at 21:00


Linagliptin (Tradjenta) 5 mg DAILY PO  Last administered on 6/7/19at 13:00; 


Admin Dose 5 MG;  Start 6/7/19 at 11:30





Assessment/Plan


Hospital Course (Demo Recall)


Assessment





1.  Acute congestive cardiac failure likely secondary to acute bronchitis and 


mild congestive cardiac failure.





Plan





1.  Decrease O2 as tolerated


2.  Bronchodilators





Anticipate discharge tomorrow.  Follow-up with me as an outpatient.











WILLI FRIEND MD, [facility]      Jun 7, 2019 13:27

## 2019-06-08 VITALS — HEART RATE: 88 BPM | DIASTOLIC BLOOD PRESSURE: 66 MMHG | RESPIRATION RATE: 18 BRPM | SYSTOLIC BLOOD PRESSURE: 114 MMHG

## 2019-06-08 VITALS — DIASTOLIC BLOOD PRESSURE: 66 MMHG | HEART RATE: 96 BPM | SYSTOLIC BLOOD PRESSURE: 98 MMHG | RESPIRATION RATE: 18 BRPM

## 2019-06-08 VITALS — SYSTOLIC BLOOD PRESSURE: 129 MMHG | HEART RATE: 91 BPM | RESPIRATION RATE: 18 BRPM | DIASTOLIC BLOOD PRESSURE: 73 MMHG

## 2019-06-08 LAB
% IRON SATURATION: 10 % SAT (ref 22–52)
ADD MAN DIFF?: NO
ALLEN TEST: (no result)
ANION GAP: 7 (ref 5–13)
ARTERIAL BASE EXCESS: 6.3 MMOL/L (ref -3–3)
ARTERIAL BLOOD GAS OXYGEN SAT: 85.3 MMHG (ref 95–98)
ARTERIAL COHB: 0.3 % (ref 0–3)
ARTERIAL FRACTION OF OXYHGB: 85 % (ref 93–99)
ARTERIAL HCO3: 30.7 MMOL/L (ref 22–26)
ARTERIAL METHB: 0 % (ref 0–1.5)
ARTERIAL PCO2: 43.4 MMHG (ref 35–45)
BASOPHIL #: 0.1 10^3/UL (ref 0–0.1)
BASOPHILS %: 0.5 % (ref 0–2)
BLOOD UREA NITROGEN: 20 MG/DL (ref 7–20)
CALCIUM: 9.4 MG/DL (ref 8.4–10.2)
CARBON DIOXIDE: 35 MMOL/L (ref 21–31)
CHLORIDE: 100 MMOL/L (ref 97–110)
CREATININE: 0.67 MG/DL (ref 0.44–1)
EOSINOPHILS #: 0.1 10^3/UL (ref 0–0.5)
EOSINOPHILS %: 1.2 % (ref 0–7)
FIO2: 21 %
GLUCOSE: 168 MG/DL (ref 70–220)
HEMATOCRIT: 37 % (ref 37–47)
HEMOGLOBIN: 11.4 G/DL (ref 12–16)
IMMATURE GRANS #M: 0.16 10^3/UL (ref 0–0.03)
IMMATURE GRANS % (M): 1.5 % (ref 0–0.43)
IRON: 33 UG/DL (ref 35–150)
LYMPHOCYTES #: 3 10^3/UL (ref 0.8–2.9)
LYMPHOCYTES %: 28.7 % (ref 15–51)
MEAN CORPUSCULAR HEMOGLOBIN: 26.4 PG (ref 29–33)
MEAN CORPUSCULAR HGB CONC: 30.8 G/DL (ref 32–37)
MEAN CORPUSCULAR VOLUME: 85.6 FL (ref 82–101)
MEAN PLATELET VOLUME: 11 FL (ref 7.4–10.4)
MODE: (no result)
MONOCYTE #: 0.6 10^3/UL (ref 0.3–0.9)
MONOCYTES %: 6 % (ref 0–11)
NEUTROPHIL #: 6.4 10^3/UL (ref 1.6–7.5)
NEUTROPHILS %: 62.1 % (ref 39–77)
NUCLEATED RED BLOOD CELLS #: 0 10^3/UL (ref 0–0)
NUCLEATED RED BLOOD CELLS%: 0 /100WBC (ref 0–0)
O2 A-A PPRESDIFF RESPIRATORY: 49.1 MMHG (ref 7–24)
PLATELET COUNT: 315 10^3/UL (ref 140–415)
POTASSIUM: 4.7 MMOL/L (ref 3.5–5.1)
RED BLOOD COUNT: 4.32 10^6/UL (ref 4.2–5.4)
RED CELL DISTRIBUTION WIDTH: 15.5 % (ref 11.5–14.5)
SODIUM: 142 MMOL/L (ref 135–144)
THYROID STIMULATING HORMONE: 0.91 MIU/L (ref 0.47–4.68)
TOTAL IRON BINDING CAPACITY: 326 UG/DL (ref 241–421)
WHITE BLOOD COUNT: 10.4 10^3/UL (ref 4.8–10.8)

## 2019-06-08 RX ADMIN — INSULIN ASPART 1 UNIT: 100 INJECTION, SOLUTION INTRAVENOUS; SUBCUTANEOUS at 12:49

## 2019-06-08 RX ADMIN — FAMOTIDINE 1 MG: 20 TABLET ORAL at 08:36

## 2019-06-08 RX ADMIN — INSULIN ASPART 1 UNIT: 100 INJECTION, SOLUTION INTRAVENOUS; SUBCUTANEOUS at 20:31

## 2019-06-08 RX ADMIN — ONDANSETRON HYDROCHLORIDE 1 MG: 2 INJECTION, SOLUTION INTRAMUSCULAR; INTRAVENOUS at 23:31

## 2019-06-08 RX ADMIN — CEFTRIAXONE SCH MLS/HR: 1 INJECTION, SOLUTION INTRAVENOUS at 16:01

## 2019-06-08 RX ADMIN — AZITHROMYCIN MONOHYDRATE SCH MLS/HR: 500 INJECTION, POWDER, LYOPHILIZED, FOR SOLUTION INTRAVENOUS at 16:59

## 2019-06-08 RX ADMIN — VENLAFAXINE HYDROCHLORIDE 1 MG: 75 CAPSULE, EXTENDED RELEASE ORAL at 08:32

## 2019-06-08 RX ADMIN — INSULIN ASPART 1 UNIT: 100 INJECTION, SOLUTION INTRAVENOUS; SUBCUTANEOUS at 07:50

## 2019-06-08 RX ADMIN — INSULIN ASPART 1 UNIT: 100 INJECTION, SOLUTION INTRAVENOUS; SUBCUTANEOUS at 08:35

## 2019-06-08 RX ADMIN — ATORVASTATIN CALCIUM SCH MG: 40 TABLET, FILM COATED ORAL at 20:24

## 2019-06-08 RX ADMIN — DEXAMETHASONE SODIUM PHOSPHATE PRN MG: 10 INJECTION, SOLUTION INTRAMUSCULAR; INTRAVENOUS at 23:31

## 2019-06-08 RX ADMIN — IPRATROPIUM BROMIDE AND ALBUTEROL SULFATE SCH ML: .5; 3 SOLUTION RESPIRATORY (INHALATION) at 04:40

## 2019-06-08 RX ADMIN — INSULIN GLARGINE 1 UNITS: 100 INJECTION, SOLUTION SUBCUTANEOUS at 20:26

## 2019-06-08 RX ADMIN — IPRATROPIUM BROMIDE AND ALBUTEROL SULFATE 1 ML: .5; 3 SOLUTION RESPIRATORY (INHALATION) at 12:56

## 2019-06-08 RX ADMIN — DEXTROSE MONOHYDRATE 1 MLS/HR: 5 INJECTION, SOLUTION INTRAVENOUS at 16:59

## 2019-06-08 RX ADMIN — IPRATROPIUM BROMIDE AND ALBUTEROL SULFATE SCH ML: .5; 3 SOLUTION RESPIRATORY (INHALATION) at 20:16

## 2019-06-08 RX ADMIN — IPRATROPIUM BROMIDE AND ALBUTEROL SULFATE SCH ML: .5; 3 SOLUTION RESPIRATORY (INHALATION) at 08:00

## 2019-06-08 RX ADMIN — INSULIN GLARGINE SCH UNITS: 100 INJECTION, SOLUTION SUBCUTANEOUS at 20:26

## 2019-06-08 RX ADMIN — FUROSEMIDE 1 MG: 10 INJECTION, SOLUTION INTRAVENOUS at 08:36

## 2019-06-08 RX ADMIN — GABAPENTIN 1 MG: 300 CAPSULE ORAL at 20:24

## 2019-06-08 RX ADMIN — IPRATROPIUM BROMIDE AND ALBUTEROL SULFATE 1 ML: .5; 3 SOLUTION RESPIRATORY (INHALATION) at 04:40

## 2019-06-08 RX ADMIN — BUPROPION HYDROCHLORIDE 1 MG: 150 TABLET, EXTENDED RELEASE ORAL at 08:32

## 2019-06-08 RX ADMIN — FENOFIBRATE SCH MG: 145 TABLET ORAL at 08:32

## 2019-06-08 RX ADMIN — LINAGLIPTIN SCH MG: 5 TABLET, FILM COATED ORAL at 08:36

## 2019-06-08 RX ADMIN — IPRATROPIUM BROMIDE AND ALBUTEROL SULFATE 1 ML: .5; 3 SOLUTION RESPIRATORY (INHALATION) at 20:16

## 2019-06-08 RX ADMIN — VENLAFAXINE HYDROCHLORIDE SCH MG: 75 CAPSULE, EXTENDED RELEASE ORAL at 08:32

## 2019-06-08 RX ADMIN — IPRATROPIUM BROMIDE AND ALBUTEROL SULFATE SCH ML: .5; 3 SOLUTION RESPIRATORY (INHALATION) at 12:56

## 2019-06-08 RX ADMIN — LINAGLIPTIN 1 MG: 5 TABLET, FILM COATED ORAL at 08:36

## 2019-06-08 RX ADMIN — IPRATROPIUM BROMIDE AND ALBUTEROL SULFATE 1 ML: .5; 3 SOLUTION RESPIRATORY (INHALATION) at 17:52

## 2019-06-08 RX ADMIN — IPRATROPIUM BROMIDE AND ALBUTEROL SULFATE SCH ML: .5; 3 SOLUTION RESPIRATORY (INHALATION) at 00:02

## 2019-06-08 RX ADMIN — FAMOTIDINE SCH MG: 20 TABLET ORAL at 20:23

## 2019-06-08 RX ADMIN — IPRATROPIUM BROMIDE AND ALBUTEROL SULFATE 1 ML: .5; 3 SOLUTION RESPIRATORY (INHALATION) at 08:00

## 2019-06-08 RX ADMIN — GABAPENTIN SCH MG: 300 CAPSULE ORAL at 12:46

## 2019-06-08 RX ADMIN — GABAPENTIN 1 MG: 300 CAPSULE ORAL at 12:46

## 2019-06-08 RX ADMIN — INSULIN ASPART 1 UNIT: 100 INJECTION, SOLUTION INTRAVENOUS; SUBCUTANEOUS at 17:48

## 2019-06-08 RX ADMIN — IPRATROPIUM BROMIDE AND ALBUTEROL SULFATE SCH ML: .5; 3 SOLUTION RESPIRATORY (INHALATION) at 17:52

## 2019-06-08 RX ADMIN — FERROUS SULFATE TAB 325 MG (65 MG ELEMENTAL FE) 1 MG: 325 (65 FE) TAB at 20:24

## 2019-06-08 RX ADMIN — LISINOPRIL 1 MG: 10 TABLET ORAL at 08:34

## 2019-06-08 RX ADMIN — FAMOTIDINE SCH MG: 20 TABLET ORAL at 08:36

## 2019-06-08 RX ADMIN — BUPROPION HYDROCHLORIDE SCH MG: 150 TABLET, EXTENDED RELEASE ORAL at 08:32

## 2019-06-08 RX ADMIN — GABAPENTIN SCH MG: 300 CAPSULE ORAL at 20:24

## 2019-06-08 RX ADMIN — ATORVASTATIN CALCIUM 1 MG: 40 TABLET, FILM COATED ORAL at 20:24

## 2019-06-08 RX ADMIN — FAMOTIDINE 1 MG: 20 TABLET ORAL at 20:23

## 2019-06-08 RX ADMIN — FENOFIBRATE 1 MG: 145 TABLET, FILM COATED ORAL at 08:32

## 2019-06-08 RX ADMIN — IPRATROPIUM BROMIDE AND ALBUTEROL SULFATE 1 ML: .5; 3 SOLUTION RESPIRATORY (INHALATION) at 00:02

## 2019-06-08 RX ADMIN — FERROUS SULFATE TAB 325 MG (65 MG ELEMENTAL FE) SCH MG: 325 (65 FE) TAB at 13:24

## 2019-06-08 RX ADMIN — GABAPENTIN SCH MG: 300 CAPSULE ORAL at 08:32

## 2019-06-08 RX ADMIN — FERROUS SULFATE TAB 325 MG (65 MG ELEMENTAL FE) 1 MG: 325 (65 FE) TAB at 13:24

## 2019-06-08 RX ADMIN — CEFTRIAXONE 1 MLS/HR: 1 INJECTION, SOLUTION INTRAVENOUS at 16:01

## 2019-06-08 RX ADMIN — GABAPENTIN 1 MG: 300 CAPSULE ORAL at 08:32

## 2019-06-08 RX ADMIN — LISINOPRIL SCH MG: 10 TABLET ORAL at 08:34

## 2019-06-08 RX ADMIN — FERROUS SULFATE TAB 325 MG (65 MG ELEMENTAL FE) SCH MG: 325 (65 FE) TAB at 20:24

## 2019-06-08 NOTE — PN
Date/Time of Note


Date/Time of Note


DATE: 6/8/19 


TIME: 13:19





Assessment/Plan


VTE Prophylaxis


Risk score (from Ns)>0 risk:  3


SCD applied (from Ns):  Yes


Pharmacological prophylaxis:  NA/contraindicated


Pharm contraindication:  low risk/ambulating





Lines/Catheters


IV Catheter Type (from [facility]):  Saline Lock


Urinary Cath still in place:  No





Assessment/Plan


Hospital Course


1.  SOB Pneumonia in the lower lung zones. vs CHF. Chest xray is clear


2.  Systemic inflammatory response syndrome secondary to #2.


3.  Hypoxia secondary to #1.


4.  Rule out obstructive sleep apnea.


5.  Hypertension.


6.  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


7.  Depression.


8.  Hyperlipidemia.


9.  Hypercholesterolemia.


10.  History of asthma.


11. Obesity


12. Normocytic hypochromic anemia


Assessment/Plan


dc today, Ok with Pulmonary , dr Walter


-c/w Tradjenta, BS is better


-nursing reported a sweets in pt home


-cw Robitussin for as needed cough


-c/w Nebs


-pulmonary MD apreciated


-cw azithromycin/Rocephin


-Lasix per pulmonary


-Echo EF >65%


-GI proph. Famotidine.


-DVT prophylaxis SCD bilaterally


Result Diagram:  


6/8/19 0447 6/8/19 0447





Results 24hrs





Laboratory Tests


Test
                     6/7/19
18:18  6/7/19
21:04  6/8/19
02:04  6/8/19
04:47


Bedside Glucose                  218          236  H         164


White Blood Count                                                         10.4


Red Blood Count                                                           4.32


Hemoglobin                                                               11.4  L


Hematocrit                                                                37.0


Mean Corpuscular                                                          85.6


Volume


Mean Corpuscular                                                         26.4  L


Hemoglobin


Mean Corpuscular    
                   
             
                 30.8  L



Hemoglobin
Concent


Red Cell                                                                 15.5  H


Distribution Width


Platelet Count                                                             315


Mean Platelet                                                            11.0  H


Volume


Immature                                                                1.500  H


Granulocytes %


Neutrophils %                                                             62.1


Lymphocytes %                                                             28.7


Monocytes %                                                                6.0


Eosinophils %                                                              1.2


Basophils %                                                                0.5


Nucleated Red                                                              0.0


Blood Cells %


Immature                                                                0.160  H


Granulocytes #


Neutrophils #                                                              6.4


Lymphocytes #                                                             3.0  H


Monocytes #                                                                0.6


Eosinophils #                                                              0.1


Basophils #                                                                0.1


Nucleated Red                                                              0.0


Blood Cells #


Sodium Level                                                               142


Potassium Level                                                            4.7


Chloride Level                                                             100


Carbon Dioxide                                                             35  H


Level


Anion Gap                                                                    7


Blood Urea                                                                  20


Nitrogen


Creatinine                                                                0.67


Est Glomerular      
                   
             
             > 60  



Filtrat


Rate
mL/min


Glucose Level                                                             168  #


Calcium Level                                                              9.4


Iron Level                                                                 33  L


Total Iron Binding                                                         326


Capacity


Percent Iron                                                               10  L


Saturation


Thyroid             
                   
             
                 0.914  



Stimulating


Hormone
(TSH)


Test
                     6/8/19
07:51  6/8/19
08:24  6/8/19
12:43  



Blood Gas Specimen  Blood arterial
     
             
             



Source



Arterial Blood        6/8/2019
8:55:07  
             
             



Date Drawn
                         AM


Arterial Blood pH            7.467  H
  
             
             



(Temp
corrected)


Arterial Blood                 43.4  
  
             
             



pCO2


(Temp
correct)


Arterial Blood pO2           48.7  *L
  
             
             



(Temp
corrected)


Arterial Blood                 30.7  H


HCO3


Arterial Blood                  6.3  H


Base Excess


Arterial Blood                85.3  L
  
             
             



Oxygen
Saturation


Hiren Test          ACCEPTAB


Arterial Blood Gas  Right Radial  
     
             
             



Puncture
Site


Arterial                        0.3  
  
             
             



Blood
Carboxyhemog


lobin


Arterial Blood                     0


Methemoglobin


Blood Gas A-a O2               49.1  H


Differential


Oxyhemoglobin                  85.0  L


Percent


Blood Gas                       37.0


Temperature


Blood Gas Modality  ROOM AIR


FiO2                            21.0


Blood Gas Critical  YULIANA RN  
       
             
             



Value Read
Back


Blood Gas Notified  CW


Whom


Blood Gas Notified    6/8/2019
9:16:11  
             
             



Time
                               AM


Bedside Glucose                                129           218








Subjective


24 Hr Interval Summary


Constitutional:  improved





Exam/Review of Systems


Exam


Vitals





Vital Signs


  Date      Temp  Pulse  Resp  B/P (MAP)   Pulse Ox  O2          O2 Flow    FiO2


Time                                                 Delivery    Rate


    6/8/19           90    20


     12:56


    6/8/19                                       96                    2.0


     12:56


    6/8/19  98.0                   114/66


     08:23                           (82)


    6/8/19                                                                    21


     04:42


    6/7/19                                           Nasal


     20:27                                           Cannula








Intake and Output





6/7/19 6/7/19 6/8/19





1515:00


23:00


07:00





IntakeIntake Total


540 ml





BalanceBalance


540 ml











Constitutional:  alert, oriented


Head:  normocephalic


Neck:  supple


Respiratory:  diminished breath sounds, other (rhonchi bilaterally)


Cardiovascular:  regular rate and rhythm


Extremities:  edema; 


   No normal pulses, No calf tenderness, No cyanosis, No clubbing, No pitting 


pedal edema, No palpable cord, No tenderness, No other





Results


Results 24hrs





Laboratory Tests


Test
                     6/7/19
18:18  6/7/19
21:04  6/8/19
02:04  6/8/19
04:47


Bedside Glucose                  218          236  H         164


White Blood Count                                                         10.4


Red Blood Count                                                           4.32


Hemoglobin                                                               11.4  L


Hematocrit                                                                37.0


Mean Corpuscular                                                          85.6


Volume


Mean Corpuscular                                                         26.4  L


Hemoglobin


Mean Corpuscular    
                   
             
                 30.8  L



Hemoglobin
Concent


Red Cell                                                                 15.5  H


Distribution Width


Platelet Count                                                             315


Mean Platelet                                                            11.0  H


Volume


Immature                                                                1.500  H


Granulocytes %


Neutrophils %                                                             62.1


Lymphocytes %                                                             28.7


Monocytes %                                                                6.0


Eosinophils %                                                              1.2


Basophils %                                                                0.5


Nucleated Red                                                              0.0


Blood Cells %


Immature                                                                0.160  H


Granulocytes #


Neutrophils #                                                              6.4


Lymphocytes #                                                             3.0  H


Monocytes #                                                                0.6


Eosinophils #                                                              0.1


Basophils #                                                                0.1


Nucleated Red                                                              0.0


Blood Cells #


Sodium Level                                                               142


Potassium Level                                                            4.7


Chloride Level                                                             100


Carbon Dioxide                                                             35  H


Level


Anion Gap                                                                    7


Blood Urea                                                                  20


Nitrogen


Creatinine                                                                0.67


Est Glomerular      
                   
             
             > 60  



Filtrat


Rate
mL/min


Glucose Level                                                             168  #


Calcium Level                                                              9.4


Iron Level                                                                 33  L


Total Iron Binding                                                         326


Capacity


Percent Iron                                                               10  L


Saturation


Thyroid             
                   
             
                 0.914  



Stimulating


Hormone
(TSH)


Test
                     6/8/19
07:51  6/8/19
08:24  6/8/19
12:43  



Blood Gas Specimen  Blood arterial
     
             
             



Source



Arterial Blood        6/8/2019
8:55:07  
             
             



Date Drawn
                         AM


Arterial Blood pH            7.467  H
  
             
             



(Temp
corrected)


Arterial Blood                 43.4  
  
             
             



pCO2


(Temp
correct)


Arterial Blood pO2           48.7  *L
  
             
             



(Temp
corrected)


Arterial Blood                 30.7  H


HCO3


Arterial Blood                  6.3  H


Base Excess


Arterial Blood                85.3  L
  
             
             



Oxygen
Saturation


Hiren Test          ACCEPTAB


Arterial Blood Gas  Right Radial  
     
             
             



Puncture
Site


Arterial                        0.3  
  
             
             



Blood
Carboxyhemog


lobin


Arterial Blood                     0


Methemoglobin


Blood Gas A-a O2               49.1  H


Differential


Oxyhemoglobin                  85.0  L


Percent


Blood Gas                       37.0


Temperature


Blood Gas Modality  ROOM AIR


FiO2                            21.0


Blood Gas Critical  YULIANA MOORE  
       
             
             



Value Read
Back


Blood Gas Notified  CW


Whom


Blood Gas Notified    6/8/2019
9:16:11  
             
             



Time
                               AM


Bedside Glucose                                129           218








Medications


Medication





Current Medications


Albuterol/ Ipratropium (Duoneb) 3 ml Q4H RESP  THERAPY HHN  Last administered on


6/8/19at 12:56; Admin Dose 3 ML;  Start 6/4/19 at 05:00


Albuterol/ Ipratropium (Duoneb) 3 ml Q2H RESP THERAPY  PRN HHN SHORTNESS OF 


BREATH;  Start 6/4/19 at 04:00


Acetaminophen (Tylenol Tab) 650 mg Q6H  PRN PO MILD PAIN(1-3)OR ELEVATED TEMP 


Last administered on 6/6/19at 13:50; Admin Dose 650 MG;  Start 6/4/19 at 04:00


Diagnostic Test (Pha) (Accu-Chek) 1 ea 02 XX  Last administered on 6/8/19at 


02:06; Admin Dose 1 EA;  Start 6/5/19 at 02:00


Insulin Aspart (Novolog Insulin Pen) NOVOLOG *MILD* ALGORITHM WITH MEALS  


BEDTIME SC  Last administered on 6/8/19at 12:49; Admin Dose 2 UNIT;  Start 


6/4/19 at 08:00


Miscellaneous Information 1 ea NOTE XX ;  Start 6/4/19 at 04:30


Glucose (Glutose) 15 gm Q15M  PRN PO DECREASED GLUCOSE;  Start 6/4/19 at 04:30


Glucose (Glutose) 22.5 gm Q15M  PRN PO DECREASED GLUCOSE;  Start 6/4/19 at 04:30


Dextrose (D50w Syringe) 25 ml Q15M  PRN IV DECREASED GLUCOSE;  Start 6/4/19 at 


04:30


Dextrose (D50w Syringe) 50 ml Q15M  PRN IV DECREASED GLUCOSE;  Start 6/4/19 at 


04:30


Glucagon (Glucagen) 1 mg Q15M  PRN IM DECREASED GLUCOSE;  Start 6/4/19 at 04:30


Glucose (Glutose) 15 gm Q15M  PRN BUCCAL DECREASED GLUCOSE;  Start 6/4/19 at 


04:30


Atorvastatin Calcium (Lipitor) 40 mg QHS PO  Last administered on 6/7/19at 


21:01; Admin Dose 40 MG;  Start 6/4/19 at 21:00


Bupropion HCl (Wellbutrin Xl) 300 mg DAILY PO  Last administered on 6/8/19at 


08:32; Admin Dose 300 MG;  Start 6/4/19 at 13:15


Fenofibrate (Tricor) 145 mg DAILY PO  Last administered on 6/8/19at 08:32; Admin


Dose 145 MG;  Start 6/4/19 at 13:15


Gabapentin (Neurontin) 300 mg TID PO  Last administered on 6/8/19at 12:46; Admin


Dose 300 MG;  Start 6/4/19 at 13:15


Lisinopril (Zestril) 10 mg DAILY PO  Last administered on 6/8/19at 08:34; Admin 


Dose 10 MG;  Start 6/4/19 at 13:15


Metformin HCl (Glucophage) 1,000 mg WITH BREAKFAST  DINNE PO  Last administered 


on 6/8/19at 08:36; Admin Dose 1,000 MG;  Start 6/4/19 at 18:00


Tramadol HCl (Ultram) 50 mg BID PO  Last administered on 6/8/19at 08:33; Admin 


Dose 50 MG;  Start 6/4/19 at 13:15


Venlafaxine HCl (Effexor Xr) 150 mg DAILY PO  Last administered on 6/8/19at 


08:32; Admin Dose 150 MG;  Start 6/4/19 at 13:30


Diagnostic Test (Pha) (Accu-Chek) 1 ea AC MEALS AND  BEDTIME XX  Last 


administered on 6/8/19at 12:52; Admin Dose 1 EA;  Start 6/4/19 at 17:30


Furosemide (Lasix) 40 mg DAILY IV  Last administered on 6/8/19at 08:36; Admin 


Dose 40 MG;  Start 6/4/19 at 15:00


Zolpidem Tartrate (Ambien) 5 mg HS  PRN PO INSOMNIA Last administered on 6 /7/19at 00:40; Admin Dose 5 MG;  Start 6/4/19 at 22:00


Ceftriaxone Sodium 50 ml @  100 mls/hr Q24H IVPB  Last administered on 6/7/19at 


16:13; Admin Dose 100 MLS/HR;  Start 6/5/19 at 16:00


Insulin Aspart (Novolog Insulin Pen) 7 unit WITH  MEALS SC  Last administered on


6/8/19at 12:49; Admin Dose 7 UNIT;  Start 6/5/19 at 18:00


Insulin Glargine (Lantus) 22 units DAILY@2000 SC  Last administered on 6/7/19at 


21:07; Admin Dose 22 UNITS;  Start 6/5/19 at 20:00


Guaifenesin/ Dextromethorphan (Robitussin Dm Liquid Cup) 10 ml Q4H  PRN PO COUGH


Last administered on 6/7/19at 21:29; Admin Dose 10 ML;  Start 6/5/19 at 15:00


Azithromycin 250 mg/Sodium Chloride 250 ml @  250 mls/hr Q24H IVPB  Last 


administered on 6/7/19at 17:08; Admin Dose 250 MLS/HR;  Start 6/5/19 at 16:00


Famotidine (Pepcid) 20 mg BID PO  Last administered on 6/8/19at 08:36; Admin 


Dose 20 MG;  Start 6/5/19 at 21:00


Linagliptin (Tradjenta) 5 mg DAILY PO  Last administered on 6/8/19at 08:36; 


Admin Dose 5 MG;  Start 6/7/19 at 11:30


Ferrous Sulfate (Ferrous Sulfate (Ec)) 325 mg BID PO ;  Start 6/8/19 at 13:00











ZELDA BEATTY                 Jun 8, 2019 13:25

## 2019-06-09 VITALS — HEART RATE: 89 BPM | RESPIRATION RATE: 18 BRPM | SYSTOLIC BLOOD PRESSURE: 120 MMHG | DIASTOLIC BLOOD PRESSURE: 63 MMHG

## 2019-06-09 VITALS — HEART RATE: 108 BPM | RESPIRATION RATE: 18 BRPM | DIASTOLIC BLOOD PRESSURE: 63 MMHG | SYSTOLIC BLOOD PRESSURE: 103 MMHG

## 2019-06-09 VITALS — SYSTOLIC BLOOD PRESSURE: 115 MMHG | DIASTOLIC BLOOD PRESSURE: 57 MMHG | RESPIRATION RATE: 18 BRPM | HEART RATE: 86 BPM

## 2019-06-09 LAB
ADD MAN DIFF?: NO
ANION GAP: 8 (ref 5–13)
BASOPHIL #: 0.1 10^3/UL (ref 0–0.1)
BASOPHILS %: 0.4 % (ref 0–2)
BLOOD UREA NITROGEN: 29 MG/DL (ref 7–20)
CALCIUM: 9.8 MG/DL (ref 8.4–10.2)
CARBON DIOXIDE: 36 MMOL/L (ref 21–31)
CHLORIDE: 94 MMOL/L (ref 97–110)
CREATININE: 0.88 MG/DL (ref 0.44–1)
EOSINOPHILS #: 0.1 10^3/UL (ref 0–0.5)
EOSINOPHILS %: 1 % (ref 0–7)
GLUCOSE: 199 MG/DL (ref 70–220)
HEMATOCRIT: 38.1 % (ref 37–47)
HEMOGLOBIN: 11.8 G/DL (ref 12–16)
IMMATURE GRANS #M: 0.14 10^3/UL (ref 0–0.03)
IMMATURE GRANS % (M): 1.1 % (ref 0–0.43)
LYMPHOCYTES #: 3.5 10^3/UL (ref 0.8–2.9)
LYMPHOCYTES %: 28.5 % (ref 15–51)
MEAN CORPUSCULAR HEMOGLOBIN: 26.1 PG (ref 29–33)
MEAN CORPUSCULAR HGB CONC: 31 G/DL (ref 32–37)
MEAN CORPUSCULAR VOLUME: 84.3 FL (ref 82–101)
MEAN PLATELET VOLUME: 10.8 FL (ref 7.4–10.4)
MONOCYTE #: 0.7 10^3/UL (ref 0.3–0.9)
MONOCYTES %: 5.7 % (ref 0–11)
NEUTROPHIL #: 7.7 10^3/UL (ref 1.6–7.5)
NEUTROPHILS %: 63.3 % (ref 39–77)
NUCLEATED RED BLOOD CELLS #: 0 10^3/UL (ref 0–0)
NUCLEATED RED BLOOD CELLS%: 0 /100WBC (ref 0–0)
PLATELET COUNT: 311 10^3/UL (ref 140–415)
POTASSIUM: 4.3 MMOL/L (ref 3.5–5.1)
RED BLOOD COUNT: 4.52 10^6/UL (ref 4.2–5.4)
RED CELL DISTRIBUTION WIDTH: 15.5 % (ref 11.5–14.5)
SODIUM: 138 MMOL/L (ref 135–144)
WHITE BLOOD COUNT: 12.2 10^3/UL (ref 4.8–10.8)

## 2019-06-09 RX ADMIN — LINAGLIPTIN SCH MG: 5 TABLET, FILM COATED ORAL at 08:57

## 2019-06-09 RX ADMIN — INSULIN ASPART 1 UNIT: 100 INJECTION, SOLUTION INTRAVENOUS; SUBCUTANEOUS at 17:47

## 2019-06-09 RX ADMIN — FERROUS SULFATE TAB 325 MG (65 MG ELEMENTAL FE) SCH MG: 325 (65 FE) TAB at 08:58

## 2019-06-09 RX ADMIN — GABAPENTIN SCH MG: 300 CAPSULE ORAL at 08:57

## 2019-06-09 RX ADMIN — IPRATROPIUM BROMIDE AND ALBUTEROL SULFATE SCH ML: .5; 3 SOLUTION RESPIRATORY (INHALATION) at 14:11

## 2019-06-09 RX ADMIN — FERROUS SULFATE TAB 325 MG (65 MG ELEMENTAL FE) 1 MG: 325 (65 FE) TAB at 08:58

## 2019-06-09 RX ADMIN — IPRATROPIUM BROMIDE AND ALBUTEROL SULFATE 1 ML: .5; 3 SOLUTION RESPIRATORY (INHALATION) at 09:07

## 2019-06-09 RX ADMIN — BUPROPION HYDROCHLORIDE SCH MG: 150 TABLET, EXTENDED RELEASE ORAL at 08:57

## 2019-06-09 RX ADMIN — LINAGLIPTIN 1 MG: 5 TABLET, FILM COATED ORAL at 08:57

## 2019-06-09 RX ADMIN — GABAPENTIN 1 MG: 300 CAPSULE ORAL at 08:57

## 2019-06-09 RX ADMIN — AZITHROMYCIN MONOHYDRATE SCH MLS/HR: 500 INJECTION, POWDER, LYOPHILIZED, FOR SOLUTION INTRAVENOUS at 16:15

## 2019-06-09 RX ADMIN — GABAPENTIN 1 MG: 300 CAPSULE ORAL at 13:00

## 2019-06-09 RX ADMIN — CEFTRIAXONE 1 MLS/HR: 1 INJECTION, SOLUTION INTRAVENOUS at 16:15

## 2019-06-09 RX ADMIN — GABAPENTIN SCH MG: 300 CAPSULE ORAL at 13:00

## 2019-06-09 RX ADMIN — IPRATROPIUM BROMIDE AND ALBUTEROL SULFATE SCH ML: .5; 3 SOLUTION RESPIRATORY (INHALATION) at 00:41

## 2019-06-09 RX ADMIN — FENOFIBRATE SCH MG: 145 TABLET ORAL at 08:57

## 2019-06-09 RX ADMIN — BUPROPION HYDROCHLORIDE 1 MG: 150 TABLET, EXTENDED RELEASE ORAL at 08:57

## 2019-06-09 RX ADMIN — LISINOPRIL 1 MG: 10 TABLET ORAL at 08:59

## 2019-06-09 RX ADMIN — FAMOTIDINE SCH MG: 20 TABLET ORAL at 08:57

## 2019-06-09 RX ADMIN — INSULIN ASPART 1 UNIT: 100 INJECTION, SOLUTION INTRAVENOUS; SUBCUTANEOUS at 12:59

## 2019-06-09 RX ADMIN — DEXAMETHASONE SODIUM PHOSPHATE PRN MG: 10 INJECTION, SOLUTION INTRAMUSCULAR; INTRAVENOUS at 14:43

## 2019-06-09 RX ADMIN — FENOFIBRATE 1 MG: 145 TABLET, FILM COATED ORAL at 08:57

## 2019-06-09 RX ADMIN — IPRATROPIUM BROMIDE AND ALBUTEROL SULFATE 1 ML: .5; 3 SOLUTION RESPIRATORY (INHALATION) at 05:49

## 2019-06-09 RX ADMIN — VENLAFAXINE HYDROCHLORIDE SCH MG: 75 CAPSULE, EXTENDED RELEASE ORAL at 08:58

## 2019-06-09 RX ADMIN — FAMOTIDINE 1 MG: 20 TABLET ORAL at 08:57

## 2019-06-09 RX ADMIN — IPRATROPIUM BROMIDE AND ALBUTEROL SULFATE SCH ML: .5; 3 SOLUTION RESPIRATORY (INHALATION) at 09:07

## 2019-06-09 RX ADMIN — IPRATROPIUM BROMIDE AND ALBUTEROL SULFATE 1 ML: .5; 3 SOLUTION RESPIRATORY (INHALATION) at 00:41

## 2019-06-09 RX ADMIN — FUROSEMIDE 1 MG: 10 INJECTION, SOLUTION INTRAVENOUS at 08:59

## 2019-06-09 RX ADMIN — ONDANSETRON HYDROCHLORIDE 1 MG: 2 INJECTION, SOLUTION INTRAMUSCULAR; INTRAVENOUS at 14:43

## 2019-06-09 RX ADMIN — VENLAFAXINE HYDROCHLORIDE 1 MG: 75 CAPSULE, EXTENDED RELEASE ORAL at 08:58

## 2019-06-09 RX ADMIN — IPRATROPIUM BROMIDE AND ALBUTEROL SULFATE 1 ML: .5; 3 SOLUTION RESPIRATORY (INHALATION) at 16:54

## 2019-06-09 RX ADMIN — IPRATROPIUM BROMIDE AND ALBUTEROL SULFATE 1 ML: .5; 3 SOLUTION RESPIRATORY (INHALATION) at 14:11

## 2019-06-09 RX ADMIN — IPRATROPIUM BROMIDE AND ALBUTEROL SULFATE SCH ML: .5; 3 SOLUTION RESPIRATORY (INHALATION) at 16:54

## 2019-06-09 RX ADMIN — CEFTRIAXONE SCH MLS/HR: 1 INJECTION, SOLUTION INTRAVENOUS at 16:15

## 2019-06-09 RX ADMIN — INSULIN ASPART 1 UNIT: 100 INJECTION, SOLUTION INTRAVENOUS; SUBCUTANEOUS at 12:45

## 2019-06-09 RX ADMIN — DEXTROSE MONOHYDRATE 1 MLS/HR: 5 INJECTION, SOLUTION INTRAVENOUS at 16:15

## 2019-06-09 RX ADMIN — IPRATROPIUM BROMIDE AND ALBUTEROL SULFATE SCH ML: .5; 3 SOLUTION RESPIRATORY (INHALATION) at 05:49

## 2019-06-09 RX ADMIN — LISINOPRIL SCH MG: 10 TABLET ORAL at 08:59

## 2019-06-09 RX ADMIN — INSULIN ASPART 1 UNIT: 100 INJECTION, SOLUTION INTRAVENOUS; SUBCUTANEOUS at 08:56

## 2019-06-09 NOTE — DS
Date/Time of Note


Date/Time of Note


DATE: 6/9/19 


TIME: 13:02





Discharge Summary


Admission/Discharge Info


Admit Date/Time


Jun 4, 2019 at 03:10


Discharge Date/Time





Discharge Diagnosis


pneumonia


Patient Condition:  Stable


Consults


Dr Lemon, pulmonology


Hospital Course


This is a 59-year-old female with a past medical history of diabetes, 


hypertension, hyperlipidemia, depression, asthma who was having shortness of 


breath and weakness and ongoing cough for past 1 week.  The patient said that 


her daughter was sick at home.  She was intermittently coughing for the last few


days and progressively worsening shortness of breath until last night.  She had 


not been sleeping for the last 3 days.  She was also having intermittently on 


and off fevers as high as to 101.  She could not feel any better.  She had some 


anterior chest, sharp pain associated with coughing that made her worried and 


came to the emergency department at [facility].  There, patient had a blood


pressure 165/80, pulse 97, temperature 100.2, respirations 24.  The patient was 


put on 6 liters.  The patient had a BUN and creatinine within normal limit.  


LFTs within normal limit.  Lactate was 1.4.  EKG showed nonspecific ST-T wave 


changes, T-wave inversions in V1, V2.  Chest x-ray showed bibasilar infiltrates.


 White count was 10.8.  Patient was started on Rocephin and doxycycline and was 


transferred due to insurance reasons.  Currently, the patient feels that she is 


feeling a little better but still coughing intermittently at times.


 


PAST MEDICAL HISTORY:


1.  Diabetes type 2.


2.  Hypertension.


3.  Hyperlipidemia.


4.  Obesity.


5.  Diabetic neuropathy.


6.  Depression.


Impressions:


1.  SOB Pneumonia in the lower lung zones. vs CHF. Chest xray is clear


2.  Systemic inflammatory response syndrome secondary to #2.


3.  Hypoxia secondary to #1.


4.  Rule out obstructive sleep apnea.


5.  Hypertension.


6.  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


7.  Depression.


8.  Hyperlipidemia.


9.  Hypercholesterolemia.


10.  History of asthma.


11. Obesity


12. Normocytic hypochromic anemia


During hospitalization pt was on med surg floor. She was under glycemic control,


we  cw Lantus to 22 from home  and cw  7 units Aspart with  her mealtime.  We  


started pt on Tradjenta, and BS got controlled. Pt was consulted that she should


not eat sweets from home. We gave pt  Robitussin for as needed cough, she was on


 Nebs around the clock. Dr Lemon was her pulmonary MD, we followed his 


recommendations. Pt was on IV azithromycin/Rocephin and Lasix per pulmonary. We 


checked her Echocardiogram, found  EF >65%. Pt is clinically improved but 


remaned hypoxic. due to extended time oxygen was ordered for home. Pt was able 


to ambulate short distance only without supplementhal oxygen.


Home Meds


Active Scripts


Levofloxacin* (Levaquin*) 500 Mg Tablet, 500 MG PO DAILY for 7 Days, TAB


   Prov:MEZENTSEVNEISHA BAIA         6/9/19


Linagliptin (TRADJENTA) 5 Mg Tablet, 5 MG PO DAILY for 30 Days, TAB


   Prov:ZELDA BEATTY         6/9/19


Ferrous Sulfate* (Ferrous Sulfate*) 325 Mg Tabec, 325 MG PO BID for 60 Days, TAB


   Prov:ZELDA BEATTY         6/9/19


Azithromycin* (Azithromycin*) 250 Mg Tablet, 250 MG PO DAILY, #4 TAB


   Prov:ZELDA BEATTY         6/9/19


Reported Medications


Venlafaxine Hcl* (Venlafaxine Hcl ER*) 150 Mg Cap.er.24h, 150 MG PO DAILY, CAP


   6/4/19


Tramadol HCl (Tramadol HCl) 50 Mg Tablet, 50 MG PO BID, #60 TAB


   6/4/19


Naproxen* (Naproxen*) 550 Mg Tablet, 550 MG PO BID PRN for PAIN, TAB


   6/4/19


Lisinopril* (Lisinopril*) 10 Mg Tablet, 10 MG PO DAILY, #30 TAB


   6/4/19


Gabapentin* (Gabapentin*) 300 Mg Capsule, 300 MG PO TID, #90 CAP


   6/4/19


Fenofibrate Nanocrystallized* (Fenofibrate*) 145 Mg Tablet, 160 MG PO DAILY, TAB


   6/4/19


Bupropion Hcl* (Bupropion Hcl SR*) 200 Mg Tablet.sa, 300 MG PO DAILY, TAB.SA


   6/4/19


Atorvastatin* (Atorvastatin*) 40 Mg Tablet, 40 MG PO QHS, #30 TAB


   6/4/19


Atenolol* (Atenolol*) 25 Mg Tablet, 25 MG PO BID, #60 TAB


   6/4/19


Metformin* (Glucophage*) 1,000 Mg Tablet, 1000 MG PO WITH BREAKFAST DINNE, #60 


TAB


   6/4/19


Follow-up Plan


PCP 1 week


Primary Care Provider


Care Physician No Primary


Time spent on discharge:  < 30 minutes


Pending Labs





Laboratory Tests


Test
               6/8/19
17:44    6/8/19
20:23    6/9/19
04:34    6/9/19
08:30


Bedside                      118             106  
                          163


Glucose
          mg/dL
()  mg/dL
()                  mg/dL
()


White Blood      
                
                         12.2  



Count
                                            10^3/ul
(4.8-1


                                                            0.8)


Red Blood        
                
                         4.52  



Count
                                            10^6/ul
(4.20-


                                                           5.40)


Hemoglobin
      
                
                         11.8  



                                                  g/dl
(12.0-16.


                                                              0)


Hematocrit
      
                
                         38.1  



                                                   %
(37.0-47.0)


Mean             
                
                         84.3  



Corpuscular                                       fl
(82.0-101.0


Volume
                                                        )


Mean             
                
                         26.1  



Corpuscular                                       pg
(29.0-33.0)


Hemoglobin



Mean             
                
                         31.0  



Corpuscular                                       g/dl
(32.0-37.


Hemoglobin
Conc                                               0)


ent


Red Cell         
                
                         15.5  



Distribution                                       %
(11.5-14.5)


Width



Platelet Count
  
                
                          311  



                                                  10^3/UL
(140-4


                                                             15)


Mean Platelet    
                
                         10.8  



Volume
                                            fl
(7.4-10.4)


Immature         
                
                        1.100  



Granulocytes %
                                   %
(0.001-0.429


                                                               )


Neutrophils %
   
                
                         63.3  



                                                   %
(39.0-77.0)


Lymphocytes %
   
                
                         28.5  



                                                   %
(15.0-51.0)


Monocytes %
     
                
                          5.7  



                                                    %
(0.0-11.0)


Eosinophils %
   
                
                          1.0  



                                                     %
(0.0-7.0)


Basophils %
     
                
                          0.4  



                                                     %
(0.0-2.0)


Nucleated Red    
                
                          0.0  



Blood Cells %
                                    /100WBC
(0.0-0


                                                             .0)


Immature         
                
                        0.140  



Granulocytes #
                                   10^3/ul
(0.0-0


                                                           .031)


Neutrophils #
   
                
                          7.7  



                                                  10^3/ul
(1.6-7


                                                             .5)


Lymphocytes #
   
                
                          3.5  



                                                  10^3/ul
(0.8-2


                                                             .9)


Monocytes #
     
                
                          0.7  



                                                  10^3/ul
(0.3-0


                                                             .9)


Eosinophils #
   
                
                          0.1  



                                                  10^3/ul
(0.0-0


                                                             .5)


Basophils #
     
                
                          0.1  



                                                  10^3/ul
(0.0-0


                                                             .1)


Nucleated Red    
                
                          0.0  



Blood Cells #
                                    10^3/ul
(0.0-0


                                                             .0)


Sodium Level
    
                
                          138  



                                                  mmol/L
(135-14


                                                              4)


Potassium        
                
                          4.3  



Level
                                            mmol/L
(3.5-5.


                                                              1)


Chloride Level
  
                
                           94  



                                                  mmol/L
(


                                                               )


Carbon Dioxide   
                
                           36  



Level
                                            mmol/L
(21-31)


Anion Gap                                              8 (5-13)


Blood Urea       
                
                           29  



Nitrogen
                                           mg/dl
(7-20)


Creatinine
      
                
                         0.88  



                                                  mg/dl
(0.44-1.


                                                             00)


Est Glomerular   
                
               > 60            



Filtrat                                           mL/min
(>60)


Rate
mL/min


Glucose Level
   
                
                          199  



                                                  mg/dl
()


Calcium Level
   
                
                          9.8  



                                                  mg/dl
(8.4-10.


                                                              2)


Test
               6/9/19
12:50  
               
               



Bedside                      135  
               
               



Glucose
          mg/dL
()














ZELDA BEATTY                 Jun 9, 2019 13:02

## 2019-06-09 NOTE — PDOCDIS
Discharge Instructions


CONDITION


                [id number]
Patient Condition:  [id number]
Stable








HOME CARE INSTRUCTIONS:


                [id number]
Diet Instructions:  [id number]
Regular








ACTIVITY:


     [id number]
Activity Restrictions:  [id number]
Slowly Increase Activity


                                        Rest between Activity


                                        Avoid heavy lifting


     [id number]
Bathing Restrictions:   [id number]
Shower








FOLLOW UP/APPOINTMENTS


Follow-up Plan


PCP 1 week











ZELDA BEATTY                 Jun 9, 2019 12:58